# Patient Record
Sex: FEMALE | Race: BLACK OR AFRICAN AMERICAN
[De-identification: names, ages, dates, MRNs, and addresses within clinical notes are randomized per-mention and may not be internally consistent; named-entity substitution may affect disease eponyms.]

---

## 2017-03-02 PROBLEM — Z00.00 ENCOUNTER FOR PREVENTIVE HEALTH EXAMINATION: Status: ACTIVE | Noted: 2017-03-02

## 2017-03-17 ENCOUNTER — APPOINTMENT (OUTPATIENT)
Dept: HEMATOLOGY ONCOLOGY | Facility: CLINIC | Age: 57
End: 2017-03-17

## 2017-03-17 ENCOUNTER — RESULT REVIEW (OUTPATIENT)
Age: 57
End: 2017-03-17

## 2017-03-17 VITALS
RESPIRATION RATE: 17 BRPM | TEMPERATURE: 97 F | WEIGHT: 293 LBS | HEIGHT: 65 IN | BODY MASS INDEX: 48.82 KG/M2 | SYSTOLIC BLOOD PRESSURE: 134 MMHG | HEART RATE: 86 BPM | DIASTOLIC BLOOD PRESSURE: 75 MMHG

## 2017-03-17 DIAGNOSIS — G47.33 OBSTRUCTIVE SLEEP APNEA (ADULT) (PEDIATRIC): ICD-10-CM

## 2017-03-17 DIAGNOSIS — Z82.49 FAMILY HISTORY OF ISCHEMIC HEART DISEASE AND OTHER DISEASES OF THE CIRCULATORY SYSTEM: ICD-10-CM

## 2017-03-17 LAB
BASOPHILS # BLD: 0.01 TH/MM3
BASOPHILS NFR BLD: 0.2 %
EOSINOPHIL # BLD: 0.22 TH/MM3
EOSINOPHIL NFR BLD: 4.1 %
ERYTHROCYTE [DISTWIDTH] IN BLOOD BY AUTOMATED COUNT: 14.6 %
GRANULOCYTES # BLD: 2.93 TH/MM3
GRANULOCYTES NFR BLD: 54.1 %
HCT VFR BLD AUTO: 37.6 %
HGB BLD-MCNC: 12.2 G/DL
IMM GRANULOCYTES # BLD: 0.01 TH/MM3
IMM GRANULOCYTES NFR BLD: 0.2 %
LYMPHOCYTES # BLD: 1.85 TH/MM3
LYMPHOCYTES NFR BLD: 34.2 %
MCH RBC QN AUTO: 27.7 PG
MCHC RBC AUTO-ENTMCNC: 32.4 G/DL
MCV RBC AUTO: 85.3 FL
MONOCYTES # BLD: 0.39 TH/MM3
MONOCYTES NFR BLD: 7.2 %
PLATELET # BLD: 229 TH/MM3
PMV BLD AUTO: 9.1 FL
RBC # BLD AUTO: 4.41 MIL/MM3
WBC # BLD: 5.41 TH/MM3

## 2017-03-20 LAB
ALBUMIN SERPL-MCNC: 3.7 G/DL
ALBUMIN/GLOB SERPL: 1.16
ALP SERPL-CCNC: 53 IU/L
ALT SERPL-CCNC: 14 IU/L
ANA PAT FLD IF-IMP: ABNORMAL
ANA TITR SER: ABNORMAL
ANION GAP SERPL CALC-SCNC: 17 MEQ/L
APTT PPP: 34.3 SEC
AST SERPL-CCNC: 31 IU/L
AT III AG PPP IA-MCNC: 105 %
BILIRUB SERPL-MCNC: 0.6 MG/DL
BUN SERPL-MCNC: 7 MG/DL
BUN/CREAT SERPL: 7.1 %
CALCIUM SERPL-MCNC: 9.3 MG/DL
CHLORIDE SERPL-SCNC: 99 MEQ/L
CO2 SERPL-SCNC: 26 MEQ/L
CREAT SERPL-MCNC: 0.99 MG/DL
GFR SERPL CREATININE-BSD FRML MDRD: 70
GLUCOSE SERPL-MCNC: 34 MG/DL
INR PPP: 2.2
LACTATE DEHYDROGENASE (NORTH): 191 IU/L
POTASSIUM SERPL-SCNC: 4.2 MMOL/L
PROT SERPL-MCNC: 6.9 G/DL
PROTEIN S ANTIGENIC (NORTH): 39 %
PROTHROMBIN TIME: 24.7 SEC
SODIUM SERPL-SCNC: 142 MEQ/L

## 2017-03-21 ENCOUNTER — RECORD ABSTRACTING (OUTPATIENT)
Age: 57
End: 2017-03-21

## 2017-03-21 DIAGNOSIS — N95.0 POSTMENOPAUSAL BLEEDING: ICD-10-CM

## 2017-03-21 DIAGNOSIS — E78.00 PURE HYPERCHOLESTEROLEMIA, UNSPECIFIED: ICD-10-CM

## 2017-03-21 DIAGNOSIS — I82.409 ACUTE EMBOLISM AND THROMBOSIS OF UNSPECIFIED DEEP VEINS OF UNSPECIFIED LOWER EXTREMITY: ICD-10-CM

## 2017-03-21 DIAGNOSIS — Z82.5 FAMILY HISTORY OF ASTHMA AND OTHER CHRONIC LOWER RESPIRATORY DISEASES: ICD-10-CM

## 2017-03-21 DIAGNOSIS — Z83.2 FAMILY HISTORY OF DISEASES OF THE BLOOD AND BLOOD-FORMING ORGANS AND CERTAIN DISORDERS INVOLVING THE IMMUNE MECHANISM: ICD-10-CM

## 2017-03-21 DIAGNOSIS — Z12.4 ENCOUNTER FOR SCREENING FOR MALIGNANT NEOPLASM OF CERVIX: ICD-10-CM

## 2017-03-21 DIAGNOSIS — G56.00 CARPAL TUNNEL SYNDROME, UNSPECIFIED UPPER LIMB: ICD-10-CM

## 2017-03-21 LAB — PROT C ACT/NOR PPP: 68 %

## 2017-03-21 RX ORDER — ALBUTEROL 90 MCG
AEROSOL (GRAM) INHALATION
Refills: 0 | Status: ACTIVE | COMMUNITY

## 2017-03-23 LAB
HCYS SERPL-MCNC: 12.3 UMOL/L
JAK2 GENE MUT ANL BLD/T: NORMAL
Lab: 52 % NORMAL
PROT S ACT/NOR PPP: 36 %
PROTEIN C ANTIGENIC (NORTH): 77 %

## 2017-03-30 LAB
B2 GLYCOPROT1 IGA SER-ACNC: < 9 SAU
B2 GLYCOPROT1 IGG SER-ACNC: < 9 SGU
B2 GLYCOPROT1 IGM SER-ACNC: < 9 SMU
CARDIOLIPIN IGG SER IA-ACNC: < 14 GPL
CARDIOLIPIN IGM SER IA-ACNC: < 12 MPL
INTERP & REPORT- F5LEI (NORTH): NORMAL

## 2017-04-05 LAB
INTERPRETATION AND REPORT- PF2 (NORTH): NORMAL
PS IGG SER-ACNC: <10 U/ML
PS IGM SER-ACNC: <25 U/ML

## 2017-05-12 ENCOUNTER — APPOINTMENT (OUTPATIENT)
Dept: HEMATOLOGY ONCOLOGY | Facility: CLINIC | Age: 57
End: 2017-05-12

## 2017-05-12 ENCOUNTER — OUTPATIENT (OUTPATIENT)
Dept: OUTPATIENT SERVICES | Facility: HOSPITAL | Age: 57
LOS: 1 days | Discharge: HOME | End: 2017-05-12

## 2017-05-12 VITALS
OXYGEN SATURATION: 100 % | TEMPERATURE: 95.9 F | DIASTOLIC BLOOD PRESSURE: 78 MMHG | RESPIRATION RATE: 12 BRPM | BODY MASS INDEX: 48.82 KG/M2 | SYSTOLIC BLOOD PRESSURE: 124 MMHG | HEART RATE: 82 BPM | WEIGHT: 293 LBS | HEIGHT: 65 IN

## 2017-06-02 ENCOUNTER — OUTPATIENT (OUTPATIENT)
Dept: OUTPATIENT SERVICES | Facility: HOSPITAL | Age: 57
LOS: 1 days | Discharge: HOME | End: 2017-06-02

## 2017-06-02 DIAGNOSIS — I50.9 HEART FAILURE, UNSPECIFIED: ICD-10-CM

## 2017-06-02 DIAGNOSIS — M79.604 PAIN IN RIGHT LEG: ICD-10-CM

## 2017-06-28 DIAGNOSIS — Z79.01 LONG TERM (CURRENT) USE OF ANTICOAGULANTS: ICD-10-CM

## 2017-06-28 DIAGNOSIS — I27.82 CHRONIC PULMONARY EMBOLISM: ICD-10-CM

## 2017-07-07 ENCOUNTER — OUTPATIENT (OUTPATIENT)
Dept: OUTPATIENT SERVICES | Facility: HOSPITAL | Age: 57
LOS: 1 days | Discharge: HOME | End: 2017-07-07

## 2017-07-07 DIAGNOSIS — E66.01 MORBID (SEVERE) OBESITY DUE TO EXCESS CALORIES: ICD-10-CM

## 2017-07-07 DIAGNOSIS — I26.99 OTHER PULMONARY EMBOLISM WITHOUT ACUTE COR PULMONALE: ICD-10-CM

## 2017-07-07 DIAGNOSIS — M79.604 PAIN IN RIGHT LEG: ICD-10-CM

## 2017-07-07 DIAGNOSIS — I82.432 ACUTE EMBOLISM AND THROMBOSIS OF LEFT POPLITEAL VEIN: ICD-10-CM

## 2017-07-07 DIAGNOSIS — I50.9 HEART FAILURE, UNSPECIFIED: ICD-10-CM

## 2017-07-07 DIAGNOSIS — I27.82 CHRONIC PULMONARY EMBOLISM: ICD-10-CM

## 2017-07-07 DIAGNOSIS — E11.42 TYPE 2 DIABETES MELLITUS WITH DIABETIC POLYNEUROPATHY: ICD-10-CM

## 2017-07-07 DIAGNOSIS — Z79.01 LONG TERM (CURRENT) USE OF ANTICOAGULANTS: ICD-10-CM

## 2017-09-01 ENCOUNTER — OUTPATIENT (OUTPATIENT)
Dept: OUTPATIENT SERVICES | Facility: HOSPITAL | Age: 57
LOS: 1 days | Discharge: HOME | End: 2017-09-01

## 2017-09-01 DIAGNOSIS — I27.82 CHRONIC PULMONARY EMBOLISM: ICD-10-CM

## 2017-09-01 DIAGNOSIS — Z79.01 LONG TERM (CURRENT) USE OF ANTICOAGULANTS: ICD-10-CM

## 2017-09-01 DIAGNOSIS — M79.604 PAIN IN RIGHT LEG: ICD-10-CM

## 2017-09-01 DIAGNOSIS — I50.9 HEART FAILURE, UNSPECIFIED: ICD-10-CM

## 2017-10-04 ENCOUNTER — OUTPATIENT (OUTPATIENT)
Dept: OUTPATIENT SERVICES | Facility: HOSPITAL | Age: 57
LOS: 1 days | Discharge: HOME | End: 2017-10-04

## 2017-10-04 DIAGNOSIS — I50.9 HEART FAILURE, UNSPECIFIED: ICD-10-CM

## 2017-10-04 DIAGNOSIS — I27.82 CHRONIC PULMONARY EMBOLISM: ICD-10-CM

## 2017-10-04 DIAGNOSIS — M79.604 PAIN IN RIGHT LEG: ICD-10-CM

## 2017-10-04 DIAGNOSIS — Z79.01 LONG TERM (CURRENT) USE OF ANTICOAGULANTS: ICD-10-CM

## 2017-10-06 ENCOUNTER — INPATIENT (INPATIENT)
Facility: HOSPITAL | Age: 57
LOS: 3 days | Discharge: HOME | End: 2017-10-10
Attending: INTERNAL MEDICINE

## 2017-10-06 DIAGNOSIS — I50.9 HEART FAILURE, UNSPECIFIED: ICD-10-CM

## 2017-10-06 DIAGNOSIS — M79.604 PAIN IN RIGHT LEG: ICD-10-CM

## 2017-10-13 DIAGNOSIS — E66.01 MORBID (SEVERE) OBESITY DUE TO EXCESS CALORIES: ICD-10-CM

## 2017-10-13 DIAGNOSIS — R06.00 DYSPNEA, UNSPECIFIED: ICD-10-CM

## 2017-10-13 DIAGNOSIS — K21.9 GASTRO-ESOPHAGEAL REFLUX DISEASE WITHOUT ESOPHAGITIS: ICD-10-CM

## 2017-10-13 DIAGNOSIS — I50.31 ACUTE DIASTOLIC (CONGESTIVE) HEART FAILURE: ICD-10-CM

## 2017-10-13 DIAGNOSIS — Z87.891 PERSONAL HISTORY OF NICOTINE DEPENDENCE: ICD-10-CM

## 2017-10-13 DIAGNOSIS — I50.9 HEART FAILURE, UNSPECIFIED: ICD-10-CM

## 2017-10-13 DIAGNOSIS — Z79.01 LONG TERM (CURRENT) USE OF ANTICOAGULANTS: ICD-10-CM

## 2017-10-13 DIAGNOSIS — J45.909 UNSPECIFIED ASTHMA, UNCOMPLICATED: ICD-10-CM

## 2017-10-13 DIAGNOSIS — Z96.652 PRESENCE OF LEFT ARTIFICIAL KNEE JOINT: ICD-10-CM

## 2017-10-13 DIAGNOSIS — M19.90 UNSPECIFIED OSTEOARTHRITIS, UNSPECIFIED SITE: ICD-10-CM

## 2017-10-13 DIAGNOSIS — Z86.718 PERSONAL HISTORY OF OTHER VENOUS THROMBOSIS AND EMBOLISM: ICD-10-CM

## 2017-10-13 DIAGNOSIS — I11.0 HYPERTENSIVE HEART DISEASE WITH HEART FAILURE: ICD-10-CM

## 2017-10-19 ENCOUNTER — OUTPATIENT (OUTPATIENT)
Dept: OUTPATIENT SERVICES | Facility: HOSPITAL | Age: 57
LOS: 1 days | Discharge: HOME | End: 2017-10-19

## 2017-10-19 DIAGNOSIS — Z79.01 LONG TERM (CURRENT) USE OF ANTICOAGULANTS: ICD-10-CM

## 2017-10-19 DIAGNOSIS — I50.9 HEART FAILURE, UNSPECIFIED: ICD-10-CM

## 2017-10-19 DIAGNOSIS — I27.82 CHRONIC PULMONARY EMBOLISM: ICD-10-CM

## 2017-10-19 DIAGNOSIS — M79.604 PAIN IN RIGHT LEG: ICD-10-CM

## 2017-11-16 ENCOUNTER — OUTPATIENT (OUTPATIENT)
Dept: OUTPATIENT SERVICES | Facility: HOSPITAL | Age: 57
LOS: 1 days | Discharge: HOME | End: 2017-11-16

## 2017-11-16 DIAGNOSIS — M79.604 PAIN IN RIGHT LEG: ICD-10-CM

## 2017-11-16 DIAGNOSIS — Z79.01 LONG TERM (CURRENT) USE OF ANTICOAGULANTS: ICD-10-CM

## 2017-11-16 DIAGNOSIS — I27.82 CHRONIC PULMONARY EMBOLISM: ICD-10-CM

## 2017-11-16 DIAGNOSIS — I50.9 HEART FAILURE, UNSPECIFIED: ICD-10-CM

## 2017-12-15 ENCOUNTER — OUTPATIENT (OUTPATIENT)
Dept: OUTPATIENT SERVICES | Facility: HOSPITAL | Age: 57
LOS: 1 days | Discharge: HOME | End: 2017-12-15

## 2017-12-15 DIAGNOSIS — I50.9 HEART FAILURE, UNSPECIFIED: ICD-10-CM

## 2017-12-15 DIAGNOSIS — I27.82 CHRONIC PULMONARY EMBOLISM: ICD-10-CM

## 2017-12-15 DIAGNOSIS — Z79.01 LONG TERM (CURRENT) USE OF ANTICOAGULANTS: ICD-10-CM

## 2017-12-15 DIAGNOSIS — M79.604 PAIN IN RIGHT LEG: ICD-10-CM

## 2017-12-29 ENCOUNTER — OUTPATIENT (OUTPATIENT)
Dept: OUTPATIENT SERVICES | Facility: HOSPITAL | Age: 57
LOS: 1 days | Discharge: HOME | End: 2017-12-29

## 2017-12-29 DIAGNOSIS — M79.604 PAIN IN RIGHT LEG: ICD-10-CM

## 2017-12-29 DIAGNOSIS — Z79.01 LONG TERM (CURRENT) USE OF ANTICOAGULANTS: ICD-10-CM

## 2017-12-29 DIAGNOSIS — I50.9 HEART FAILURE, UNSPECIFIED: ICD-10-CM

## 2017-12-29 DIAGNOSIS — I27.82 CHRONIC PULMONARY EMBOLISM: ICD-10-CM

## 2018-01-25 ENCOUNTER — OUTPATIENT (OUTPATIENT)
Dept: OUTPATIENT SERVICES | Facility: HOSPITAL | Age: 58
LOS: 1 days | Discharge: HOME | End: 2018-01-25

## 2018-01-25 DIAGNOSIS — I27.82 CHRONIC PULMONARY EMBOLISM: ICD-10-CM

## 2018-01-25 DIAGNOSIS — Z79.01 LONG TERM (CURRENT) USE OF ANTICOAGULANTS: ICD-10-CM

## 2018-01-31 ENCOUNTER — INPATIENT (INPATIENT)
Facility: HOSPITAL | Age: 58
LOS: 1 days | Discharge: HOME | End: 2018-02-02
Attending: INTERNAL MEDICINE

## 2018-02-04 DIAGNOSIS — K21.9 GASTRO-ESOPHAGEAL REFLUX DISEASE WITHOUT ESOPHAGITIS: ICD-10-CM

## 2018-02-04 DIAGNOSIS — R07.9 CHEST PAIN, UNSPECIFIED: ICD-10-CM

## 2018-02-04 DIAGNOSIS — I50.9 HEART FAILURE, UNSPECIFIED: ICD-10-CM

## 2018-02-04 DIAGNOSIS — M79.604 PAIN IN RIGHT LEG: ICD-10-CM

## 2018-02-06 DIAGNOSIS — I50.32 CHRONIC DIASTOLIC (CONGESTIVE) HEART FAILURE: ICD-10-CM

## 2018-02-06 DIAGNOSIS — Z79.01 LONG TERM (CURRENT) USE OF ANTICOAGULANTS: ICD-10-CM

## 2018-02-06 DIAGNOSIS — I25.9 CHRONIC ISCHEMIC HEART DISEASE, UNSPECIFIED: ICD-10-CM

## 2018-02-06 DIAGNOSIS — M79.602 PAIN IN LEFT ARM: ICD-10-CM

## 2018-02-06 DIAGNOSIS — Z96.652 PRESENCE OF LEFT ARTIFICIAL KNEE JOINT: ICD-10-CM

## 2018-02-06 DIAGNOSIS — I07.1 RHEUMATIC TRICUSPID INSUFFICIENCY: ICD-10-CM

## 2018-02-06 DIAGNOSIS — Z87.891 PERSONAL HISTORY OF NICOTINE DEPENDENCE: ICD-10-CM

## 2018-02-06 DIAGNOSIS — M25.561 PAIN IN RIGHT KNEE: ICD-10-CM

## 2018-02-06 DIAGNOSIS — R94.39 ABNORMAL RESULT OF OTHER CARDIOVASCULAR FUNCTION STUDY: ICD-10-CM

## 2018-02-06 DIAGNOSIS — K21.9 GASTRO-ESOPHAGEAL REFLUX DISEASE WITHOUT ESOPHAGITIS: ICD-10-CM

## 2018-02-06 DIAGNOSIS — J45.909 UNSPECIFIED ASTHMA, UNCOMPLICATED: ICD-10-CM

## 2018-02-06 DIAGNOSIS — Z88.0 ALLERGY STATUS TO PENICILLIN: ICD-10-CM

## 2018-02-06 DIAGNOSIS — E66.8 OTHER OBESITY: ICD-10-CM

## 2018-02-06 DIAGNOSIS — Z86.711 PERSONAL HISTORY OF PULMONARY EMBOLISM: ICD-10-CM

## 2018-02-06 DIAGNOSIS — R07.9 CHEST PAIN, UNSPECIFIED: ICD-10-CM

## 2018-02-06 DIAGNOSIS — Z86.718 PERSONAL HISTORY OF OTHER VENOUS THROMBOSIS AND EMBOLISM: ICD-10-CM

## 2018-02-06 DIAGNOSIS — R07.89 OTHER CHEST PAIN: ICD-10-CM

## 2018-02-06 DIAGNOSIS — I10 ESSENTIAL (PRIMARY) HYPERTENSION: ICD-10-CM

## 2018-02-12 ENCOUNTER — OUTPATIENT (OUTPATIENT)
Dept: OUTPATIENT SERVICES | Facility: HOSPITAL | Age: 58
LOS: 1 days | Discharge: HOME | End: 2018-02-12

## 2018-02-12 DIAGNOSIS — I27.82 CHRONIC PULMONARY EMBOLISM: ICD-10-CM

## 2018-02-12 DIAGNOSIS — Z79.01 LONG TERM (CURRENT) USE OF ANTICOAGULANTS: ICD-10-CM

## 2018-03-16 ENCOUNTER — OUTPATIENT (OUTPATIENT)
Dept: OUTPATIENT SERVICES | Facility: HOSPITAL | Age: 58
LOS: 1 days | Discharge: HOME | End: 2018-03-16

## 2018-03-16 DIAGNOSIS — I27.82 CHRONIC PULMONARY EMBOLISM: ICD-10-CM

## 2018-03-16 DIAGNOSIS — Z79.01 LONG TERM (CURRENT) USE OF ANTICOAGULANTS: ICD-10-CM

## 2018-03-16 DIAGNOSIS — G47.33 OBSTRUCTIVE SLEEP APNEA (ADULT) (PEDIATRIC): ICD-10-CM

## 2018-03-16 DIAGNOSIS — J45.40 MODERATE PERSISTENT ASTHMA, UNCOMPLICATED: ICD-10-CM

## 2018-03-16 DIAGNOSIS — E66.01 MORBID (SEVERE) OBESITY DUE TO EXCESS CALORIES: ICD-10-CM

## 2018-03-16 DIAGNOSIS — I26.99 OTHER PULMONARY EMBOLISM WITHOUT ACUTE COR PULMONALE: ICD-10-CM

## 2018-04-06 ENCOUNTER — EMERGENCY (EMERGENCY)
Facility: HOSPITAL | Age: 58
LOS: 0 days | Discharge: HOME | End: 2018-04-06
Attending: EMERGENCY MEDICINE

## 2018-04-06 VITALS
DIASTOLIC BLOOD PRESSURE: 77 MMHG | HEART RATE: 88 BPM | RESPIRATION RATE: 20 BRPM | OXYGEN SATURATION: 98 % | TEMPERATURE: 96 F | SYSTOLIC BLOOD PRESSURE: 175 MMHG

## 2018-04-06 DIAGNOSIS — Z88.0 ALLERGY STATUS TO PENICILLIN: ICD-10-CM

## 2018-04-06 DIAGNOSIS — M79.672 PAIN IN LEFT FOOT: ICD-10-CM

## 2018-04-06 DIAGNOSIS — Z79.01 LONG TERM (CURRENT) USE OF ANTICOAGULANTS: ICD-10-CM

## 2018-04-06 DIAGNOSIS — K21.9 GASTRO-ESOPHAGEAL REFLUX DISEASE WITHOUT ESOPHAGITIS: ICD-10-CM

## 2018-04-06 DIAGNOSIS — Z86.718 PERSONAL HISTORY OF OTHER VENOUS THROMBOSIS AND EMBOLISM: ICD-10-CM

## 2018-04-06 DIAGNOSIS — E11.9 TYPE 2 DIABETES MELLITUS WITHOUT COMPLICATIONS: ICD-10-CM

## 2018-04-06 LAB
ANION GAP SERPL CALC-SCNC: 12 MMOL/L — SIGNIFICANT CHANGE UP (ref 7–14)
APTT BLD: 28.9 SEC — SIGNIFICANT CHANGE UP (ref 27–39.2)
BASOPHILS # BLD AUTO: 0.03 K/UL — SIGNIFICANT CHANGE UP (ref 0–0.2)
BASOPHILS NFR BLD AUTO: 0.5 % — SIGNIFICANT CHANGE UP (ref 0–1)
BUN SERPL-MCNC: 10 MG/DL — SIGNIFICANT CHANGE UP (ref 10–20)
CALCIUM SERPL-MCNC: 9.1 MG/DL — SIGNIFICANT CHANGE UP (ref 8.5–10.1)
CHLORIDE SERPL-SCNC: 101 MMOL/L — SIGNIFICANT CHANGE UP (ref 98–110)
CK MB CFR SERPL CALC: 2.6 NG/ML — SIGNIFICANT CHANGE UP (ref 0.6–6.3)
CK SERPL-CCNC: 253 U/L — HIGH (ref 0–225)
CO2 SERPL-SCNC: 29 MMOL/L — SIGNIFICANT CHANGE UP (ref 17–32)
CREAT SERPL-MCNC: 0.8 MG/DL — SIGNIFICANT CHANGE UP (ref 0.7–1.5)
EOSINOPHIL # BLD AUTO: 0.34 K/UL — SIGNIFICANT CHANGE UP (ref 0–0.7)
EOSINOPHIL NFR BLD AUTO: 5.5 % — SIGNIFICANT CHANGE UP (ref 0–8)
GLUCOSE SERPL-MCNC: 88 MG/DL — SIGNIFICANT CHANGE UP (ref 70–99)
HCT VFR BLD CALC: 37.1 % — SIGNIFICANT CHANGE UP (ref 37–47)
HGB BLD-MCNC: 11.9 G/DL — LOW (ref 12–16)
IMM GRANULOCYTES NFR BLD AUTO: 0.2 % — SIGNIFICANT CHANGE UP (ref 0.1–0.3)
INR BLD: 3.06 RATIO — HIGH (ref 0.65–1.3)
LYMPHOCYTES # BLD AUTO: 1.89 K/UL — SIGNIFICANT CHANGE UP (ref 1.2–3.4)
LYMPHOCYTES # BLD AUTO: 30.8 % — SIGNIFICANT CHANGE UP (ref 20.5–51.1)
MCHC RBC-ENTMCNC: 27.5 PG — SIGNIFICANT CHANGE UP (ref 27–31)
MCHC RBC-ENTMCNC: 32.1 G/DL — SIGNIFICANT CHANGE UP (ref 32–37)
MCV RBC AUTO: 85.7 FL — SIGNIFICANT CHANGE UP (ref 81–99)
MONOCYTES # BLD AUTO: 0.46 K/UL — SIGNIFICANT CHANGE UP (ref 0.1–0.6)
MONOCYTES NFR BLD AUTO: 7.5 % — SIGNIFICANT CHANGE UP (ref 1.7–9.3)
NEUTROPHILS # BLD AUTO: 3.41 K/UL — SIGNIFICANT CHANGE UP (ref 1.4–6.5)
NEUTROPHILS NFR BLD AUTO: 55.5 % — SIGNIFICANT CHANGE UP (ref 42.2–75.2)
NRBC # BLD: 0 /100 WBCS — SIGNIFICANT CHANGE UP (ref 0–0)
NT-PROBNP SERPL-SCNC: 6 PG/ML — SIGNIFICANT CHANGE UP (ref 0–300)
PLATELET # BLD AUTO: 204 K/UL — SIGNIFICANT CHANGE UP (ref 130–400)
POTASSIUM SERPL-MCNC: 4.6 MMOL/L — SIGNIFICANT CHANGE UP (ref 3.5–5)
POTASSIUM SERPL-SCNC: 4.6 MMOL/L — SIGNIFICANT CHANGE UP (ref 3.5–5)
PROTHROM AB SERPL-ACNC: 33.8 SEC — HIGH (ref 9.95–12.87)
RBC # BLD: 4.33 M/UL — SIGNIFICANT CHANGE UP (ref 4.2–5.4)
RBC # FLD: 14.5 % — SIGNIFICANT CHANGE UP (ref 11.5–14.5)
SODIUM SERPL-SCNC: 142 MMOL/L — SIGNIFICANT CHANGE UP (ref 135–146)
TROPONIN T SERPL-MCNC: <0.01 NG/ML — SIGNIFICANT CHANGE UP
WBC # BLD: 6.14 K/UL — SIGNIFICANT CHANGE UP (ref 4.8–10.8)
WBC # FLD AUTO: 6.14 K/UL — SIGNIFICANT CHANGE UP (ref 4.8–10.8)

## 2018-04-06 NOTE — ED PROVIDER NOTE - OBJECTIVE STATEMENT
Pertinent PMH/PSH/FHx/SHx and Review of Systems contained within:  Patient presents to the ED for   59y/o F Pertinent PMH/PSH/FHx/SHx and Review of Systems contained within:  Patient presents to the ED for   57y/o F w/ hx of DVT on coumadin (a month ago= inr 2.3), type 2 diabetes sleep apnea, gerd presents for swelling and pain to L foot mainly to dorsum of foot and medial malleus started 2 days ago. Pt also has some "chest discomfort". no cough, congestion,  runny nose fevers.   In January pt had  CTA which was normal.

## 2018-04-06 NOTE — ED ADULT NURSE NOTE - OBJECTIVE STATEMENT
Patient complains of right leg and ankle pain becoming progressively worse over 3 days. Patient has hx of DVT/

## 2018-04-06 NOTE — ED PROVIDER NOTE - PROGRESS NOTE DETAILS
3/16/18 inr 2.2 ranging 2.2-2.4  today inr 3.06   patient advised to follow up [facility] PCP for INR levels.

## 2018-04-06 NOTE — ED PROVIDER NOTE - NS ED ROS FT
ROS: No fever/chills, No headache/photophobia/eye pain/changes in vision, No ear pain/sore throat/dysphagia, No chest pain/palpitations, no SOB/cough/wheeze/stridor, No abdominal pain, No N/V/D/melena, no dysuria/frequency/discharge, No neck/back pain, no rash, no changes in neurological status/function.    +ChestPain/discomfort

## 2018-04-06 NOTE — ED PROVIDER NOTE - PHYSICAL EXAMINATION
VITAL SIGNS: I have reviewed nursing notes and confirm.  CONSTITUTIONAL: Well-developed; well-nourished; in no acute distress. pt comfortable.  SKIN: skin exam is warm and dry, no acute rash.   HEAD: Normocephalic; atraumatic.  EYES:  EOM intact; conjunctiva and sclera clear.  ENT: No nasal discharge; airway clear. moist oral mucosa; uvula at midline. no pharyngeal erythema, edema exudate or vesicles.  TMs with good light reflex b/l.    NECK: Supple; non tender.  CARD: S1, S2 normal; no murmurs, gallops, or rubs. Regular rate and rhythm. posterior tibial and radial pulses 2+  RESP: No wheezes, rales or rhonchi. cta b/l. no use of accessory muscles. no retractions  ABD: Normal bowel sounds; soft; non-distended; non-tender; no rebound.   EXT: Normal ROM to ankles, toes, knees.  mild swelling to dorsum of L. foot and medial ankle. patient able to range.  posterior tibial and dorsalis pedis pulses 2+  LYMPH: No acute cervical adenopathy.  NEURO: Alert, oriented, grossly unremarkable.    PSYCH: Cooperative, appropriate.

## 2018-04-06 NOTE — ED PROVIDER NOTE - PLAN OF CARE
foot aced wrapped foot aced wrapped. pt to follow up with coumadin clinic. all lab work and results given to pt.  Pt will return for any symptoms as discussed.  Will f/u with PCP

## 2018-04-06 NOTE — ED PROVIDER NOTE - ATTENDING CONTRIBUTION TO CARE
57 y/o female with hx of DVT on Coumadin, last INR = 2.3 approximately 1 month ago presents to ED with complaint of atraumatic left foot pain and swelling x 3 days.  No CP/SOB.  No fever or chills.  PE:  left foot mild swelling, no redness or warmth.  Distal pulses equal bilaterally.  A/P:  Foot Pain NOS, check XRay and duplex given hx of DVT.

## 2018-04-06 NOTE — ED PROVIDER NOTE - CARE PLAN
Principal Discharge DX:	Foot pain, left  Assessment and plan of treatment:	foot aced wrapped Principal Discharge DX:	Foot pain, left  Assessment and plan of treatment:	foot aced wrapped. pt to follow up with coumadin clinic. all lab work and results given to pt.  Pt will return for any symptoms as discussed.  Will f/u with PCP

## 2018-04-20 ENCOUNTER — OUTPATIENT (OUTPATIENT)
Dept: OUTPATIENT SERVICES | Facility: HOSPITAL | Age: 58
LOS: 1 days | Discharge: HOME | End: 2018-04-20

## 2018-04-20 DIAGNOSIS — I27.82 CHRONIC PULMONARY EMBOLISM: ICD-10-CM

## 2018-04-20 DIAGNOSIS — Z79.01 LONG TERM (CURRENT) USE OF ANTICOAGULANTS: ICD-10-CM

## 2018-05-05 ENCOUNTER — INPATIENT (INPATIENT)
Facility: HOSPITAL | Age: 58
LOS: 5 days | Discharge: HOME | End: 2018-05-11
Attending: INTERNAL MEDICINE | Admitting: INTERNAL MEDICINE
Payer: MEDICARE

## 2018-05-05 VITALS
TEMPERATURE: 98 F | RESPIRATION RATE: 18 BRPM | SYSTOLIC BLOOD PRESSURE: 146 MMHG | OXYGEN SATURATION: 97 % | DIASTOLIC BLOOD PRESSURE: 84 MMHG | HEART RATE: 92 BPM

## 2018-05-05 LAB
ANION GAP SERPL CALC-SCNC: 15 MMOL/L — HIGH (ref 7–14)
APPEARANCE UR: CLEAR — SIGNIFICANT CHANGE UP
APTT BLD: 32.7 SEC — SIGNIFICANT CHANGE UP (ref 27–39.2)
BACTERIA # UR AUTO: (no result) /HPF
BILIRUB UR-MCNC: NEGATIVE — SIGNIFICANT CHANGE UP
BUN SERPL-MCNC: 10 MG/DL — SIGNIFICANT CHANGE UP (ref 10–20)
CALCIUM SERPL-MCNC: 8.9 MG/DL — SIGNIFICANT CHANGE UP (ref 8.5–10.1)
CHLORIDE SERPL-SCNC: 102 MMOL/L — SIGNIFICANT CHANGE UP (ref 98–110)
CO2 SERPL-SCNC: 24 MMOL/L — SIGNIFICANT CHANGE UP (ref 17–32)
COLOR SPEC: YELLOW — SIGNIFICANT CHANGE UP
CREAT SERPL-MCNC: 0.9 MG/DL — SIGNIFICANT CHANGE UP (ref 0.7–1.5)
DIFF PNL FLD: (no result)
EPI CELLS # UR: (no result) /HPF
GLUCOSE SERPL-MCNC: 99 MG/DL — SIGNIFICANT CHANGE UP (ref 70–99)
GLUCOSE UR QL: NEGATIVE MG/DL — SIGNIFICANT CHANGE UP
HCT VFR BLD CALC: 36.9 % — LOW (ref 37–47)
HGB BLD-MCNC: 11.8 G/DL — LOW (ref 12–16)
INR BLD: 3.48 RATIO — HIGH (ref 0.65–1.3)
KETONES UR-MCNC: NEGATIVE — SIGNIFICANT CHANGE UP
LEUKOCYTE ESTERASE UR-ACNC: NEGATIVE — SIGNIFICANT CHANGE UP
MCHC RBC-ENTMCNC: 26.8 PG — LOW (ref 27–31)
MCHC RBC-ENTMCNC: 32 G/DL — SIGNIFICANT CHANGE UP (ref 32–37)
MCV RBC AUTO: 83.7 FL — SIGNIFICANT CHANGE UP (ref 81–99)
NITRITE UR-MCNC: NEGATIVE — SIGNIFICANT CHANGE UP
NRBC # BLD: 0 /100 WBCS — SIGNIFICANT CHANGE UP (ref 0–0)
PH UR: 7 — SIGNIFICANT CHANGE UP (ref 5–8)
PLATELET # BLD AUTO: 204 K/UL — SIGNIFICANT CHANGE UP (ref 130–400)
POTASSIUM SERPL-MCNC: 4.1 MMOL/L — SIGNIFICANT CHANGE UP (ref 3.5–5)
POTASSIUM SERPL-SCNC: 4.1 MMOL/L — SIGNIFICANT CHANGE UP (ref 3.5–5)
PROT UR-MCNC: NEGATIVE MG/DL — SIGNIFICANT CHANGE UP
PROTHROM AB SERPL-ACNC: 38.5 SEC — HIGH (ref 9.95–12.87)
RBC # BLD: 4.41 M/UL — SIGNIFICANT CHANGE UP (ref 4.2–5.4)
RBC # FLD: 14.6 % — HIGH (ref 11.5–14.5)
RBC CASTS # UR COMP ASSIST: (no result) /HPF
SODIUM SERPL-SCNC: 141 MMOL/L — SIGNIFICANT CHANGE UP (ref 135–146)
SP GR SPEC: 1.01 — SIGNIFICANT CHANGE UP (ref 1.01–1.03)
UROBILINOGEN FLD QL: 0.2 MG/DL — SIGNIFICANT CHANGE UP (ref 0.2–0.2)
WBC # BLD: 7.88 K/UL — SIGNIFICANT CHANGE UP (ref 4.8–10.8)
WBC # FLD AUTO: 7.88 K/UL — SIGNIFICANT CHANGE UP (ref 4.8–10.8)

## 2018-05-05 RX ORDER — FUROSEMIDE 40 MG
20 TABLET ORAL DAILY
Qty: 0 | Refills: 0 | Status: DISCONTINUED | OUTPATIENT
Start: 2018-05-05 | End: 2018-05-11

## 2018-05-05 RX ORDER — CEFTRIAXONE 500 MG/1
2 INJECTION, POWDER, FOR SOLUTION INTRAMUSCULAR; INTRAVENOUS EVERY 24 HOURS
Qty: 0 | Refills: 0 | Status: DISCONTINUED | OUTPATIENT
Start: 2018-05-06 | End: 2018-05-07

## 2018-05-05 RX ORDER — LIDOCAINE 4 G/100G
1 CREAM TOPICAL ONCE
Qty: 0 | Refills: 0 | Status: COMPLETED | OUTPATIENT
Start: 2018-05-05 | End: 2018-05-05

## 2018-05-05 RX ORDER — PHENAZOPYRIDINE HCL 100 MG
100 TABLET ORAL THREE TIMES A DAY
Qty: 0 | Refills: 0 | Status: DISCONTINUED | OUTPATIENT
Start: 2018-05-05 | End: 2018-05-11

## 2018-05-05 RX ORDER — SODIUM CHLORIDE 9 MG/ML
3 INJECTION INTRAMUSCULAR; INTRAVENOUS; SUBCUTANEOUS EVERY 8 HOURS
Qty: 0 | Refills: 0 | Status: DISCONTINUED | OUTPATIENT
Start: 2018-05-05 | End: 2018-05-11

## 2018-05-05 RX ORDER — LISINOPRIL 2.5 MG/1
10 TABLET ORAL DAILY
Qty: 0 | Refills: 0 | Status: DISCONTINUED | OUTPATIENT
Start: 2018-05-05 | End: 2018-05-11

## 2018-05-05 RX ORDER — CEFTRIAXONE 500 MG/1
INJECTION, POWDER, FOR SOLUTION INTRAMUSCULAR; INTRAVENOUS
Qty: 0 | Refills: 0 | Status: DISCONTINUED | OUTPATIENT
Start: 2018-05-05 | End: 2018-05-07

## 2018-05-05 RX ORDER — AMLODIPINE BESYLATE 2.5 MG/1
10 TABLET ORAL DAILY
Qty: 0 | Refills: 0 | Status: DISCONTINUED | OUTPATIENT
Start: 2018-05-05 | End: 2018-05-11

## 2018-05-05 RX ORDER — MORPHINE SULFATE 50 MG/1
2 CAPSULE, EXTENDED RELEASE ORAL EVERY 4 HOURS
Qty: 0 | Refills: 0 | Status: DISCONTINUED | OUTPATIENT
Start: 2018-05-05 | End: 2018-05-06

## 2018-05-05 RX ORDER — METHOCARBAMOL 500 MG/1
1000 TABLET, FILM COATED ORAL ONCE
Qty: 0 | Refills: 0 | Status: COMPLETED | OUTPATIENT
Start: 2018-05-05 | End: 2018-05-05

## 2018-05-05 RX ORDER — FLUTICASONE PROPIONATE 50 MCG
0 SPRAY, SUSPENSION NASAL
Qty: 16 | Refills: 0 | COMMUNITY

## 2018-05-05 RX ORDER — ACETAMINOPHEN 500 MG
975 TABLET ORAL ONCE
Qty: 0 | Refills: 0 | Status: COMPLETED | OUTPATIENT
Start: 2018-05-05 | End: 2018-05-05

## 2018-05-05 RX ORDER — FUROSEMIDE 40 MG
0 TABLET ORAL
Qty: 30 | Refills: 0 | COMMUNITY

## 2018-05-05 RX ORDER — ALBUTEROL 90 UG/1
0 AEROSOL, METERED ORAL
Qty: 18 | Refills: 0 | COMMUNITY

## 2018-05-05 RX ORDER — PANTOPRAZOLE SODIUM 20 MG/1
0 TABLET, DELAYED RELEASE ORAL
Qty: 30 | Refills: 0 | COMMUNITY

## 2018-05-05 RX ORDER — MONTELUKAST 4 MG/1
10 TABLET, CHEWABLE ORAL DAILY
Qty: 0 | Refills: 0 | Status: DISCONTINUED | OUTPATIENT
Start: 2018-05-05 | End: 2018-05-11

## 2018-05-05 RX ORDER — BUDESONIDE AND FORMOTEROL FUMARATE DIHYDRATE 160; 4.5 UG/1; UG/1
2 AEROSOL RESPIRATORY (INHALATION)
Qty: 0 | Refills: 0 | Status: DISCONTINUED | OUTPATIENT
Start: 2018-05-05 | End: 2018-05-11

## 2018-05-05 RX ORDER — MONTELUKAST 4 MG/1
0 TABLET, CHEWABLE ORAL
Qty: 30 | Refills: 0 | COMMUNITY

## 2018-05-05 RX ORDER — PANTOPRAZOLE SODIUM 20 MG/1
40 TABLET, DELAYED RELEASE ORAL
Qty: 0 | Refills: 0 | Status: DISCONTINUED | OUTPATIENT
Start: 2018-05-05 | End: 2018-05-11

## 2018-05-05 RX ORDER — BUDESONIDE AND FORMOTEROL FUMARATE DIHYDRATE 160; 4.5 UG/1; UG/1
0 AEROSOL RESPIRATORY (INHALATION)
Qty: 10.2 | Refills: 0 | COMMUNITY

## 2018-05-05 RX ORDER — CEFTRIAXONE 500 MG/1
2 INJECTION, POWDER, FOR SOLUTION INTRAMUSCULAR; INTRAVENOUS ONCE
Qty: 0 | Refills: 0 | Status: COMPLETED | OUTPATIENT
Start: 2018-05-05 | End: 2018-05-05

## 2018-05-05 RX ORDER — ALBUTEROL 90 UG/1
2 AEROSOL, METERED ORAL EVERY 6 HOURS
Qty: 0 | Refills: 0 | Status: DISCONTINUED | OUTPATIENT
Start: 2018-05-05 | End: 2018-05-11

## 2018-05-05 RX ORDER — DEXAMETHASONE 0.5 MG/5ML
10 ELIXIR ORAL ONCE
Qty: 0 | Refills: 0 | Status: COMPLETED | OUTPATIENT
Start: 2018-05-05 | End: 2018-05-05

## 2018-05-05 RX ORDER — MORPHINE SULFATE 50 MG/1
6 CAPSULE, EXTENDED RELEASE ORAL ONCE
Qty: 0 | Refills: 0 | Status: DISCONTINUED | OUTPATIENT
Start: 2018-05-05 | End: 2018-05-05

## 2018-05-05 RX ORDER — FLUTICASONE PROPIONATE 50 MCG
1 SPRAY, SUSPENSION NASAL
Qty: 0 | Refills: 0 | Status: DISCONTINUED | OUTPATIENT
Start: 2018-05-05 | End: 2018-05-11

## 2018-05-05 RX ADMIN — CEFTRIAXONE 100 GRAM(S): 500 INJECTION, POWDER, FOR SOLUTION INTRAMUSCULAR; INTRAVENOUS at 23:11

## 2018-05-05 RX ADMIN — METHOCARBAMOL 1000 MILLIGRAM(S): 500 TABLET, FILM COATED ORAL at 17:48

## 2018-05-05 RX ADMIN — MORPHINE SULFATE 2 MILLIGRAM(S): 50 CAPSULE, EXTENDED RELEASE ORAL at 23:09

## 2018-05-05 RX ADMIN — LIDOCAINE 1 PATCH: 4 CREAM TOPICAL at 23:33

## 2018-05-05 RX ADMIN — Medication 10 MILLIGRAM(S): at 19:48

## 2018-05-05 RX ADMIN — SODIUM CHLORIDE 3 MILLILITER(S): 9 INJECTION INTRAMUSCULAR; INTRAVENOUS; SUBCUTANEOUS at 21:14

## 2018-05-05 RX ADMIN — MORPHINE SULFATE 6 MILLIGRAM(S): 50 CAPSULE, EXTENDED RELEASE ORAL at 19:48

## 2018-05-05 RX ADMIN — Medication 975 MILLIGRAM(S): at 17:48

## 2018-05-05 NOTE — ED PROVIDER NOTE - NS ED ROS FT
Constitutional: No fever, chills.  Cardiac:  No chest pain, palpitations  Respiratory:  No cough, SOB  GI:  No nausea, vomiting, diarrhea, abdominal pain.  : See HPI. No dysuria  MS:  +back pain, leg pain  Neuro:  No headache or lightheadedness  Skin:  No skin rash  Endocrine: No history of thyroid disease or diabetes.  Except as documented in the HPI,  all other systems are negative.

## 2018-05-05 NOTE — H&P ADULT - ASSESSMENT
59 y/o female with history of     1)  Recommendation  f/u mri   UA and urine culture   panculture   neurosurgery consult -   PT/Rehab    2)DVT/PE on coumadin  3)asthma  4)dvt ppx- patient on coumadin  5)code status - 59 y/o female with history of copd , dvt/pe  on coumadin ,htn , blaine on cpap ,dm-2 Presented with c/o LBP radiating to RLE x 3 days     1)LBP radiating to RLE - physical exam significant for + SLR right  ; no fecal or urinary incontinence , no saddle anesthesia , likely etiology possible disk herniation vs MSK   Recommendation  f/u mri results  neurosurgery consult - f/u recs  PT/Rehab  pain control       2)dysuria with +ve UA and lower abdomen pain tenderness - treat for cystitis not associated with sepsis or shock   iv rocephin 2 gm iv q24  f/u panculture - urine and blood     3)DVT/PE on coumadin  inr supratherapeutic - hold coumadin tonight , will resume in am as per inr  4)asthma / copd - possible  ACOS ? - stable   c/w duonebs , symbicort     5)blaine on cpap   6)dm-2 - monitor FS and initiate insulin sliding scale if FS >140  7)dvt ppx- patient on coumadin for dvt treatment   8)full code from home

## 2018-05-05 NOTE — H&P ADULT - HISTORY OF PRESENT ILLNESS
59 y/o female with history of asthma , DVT/PE on coumadin , copd , YOLIE on cpap , presented to ER for c/o 57 y/o female with history of asthma , DVT/PE on coumadin , copd , YOLIE on cpap , presented to ER for c/o low back pain radiating to right leg x 3 days  As per patient back pain was sudden in onset , first noticed 3 days ptp , sharp shooting type, 8/10 in intensity worsened by walking and movement .No h/o trauma or fall .denies urinary or fecal incontinence , fever, chills , diarrhea , constipation .  does c/o dysuria x 3 days   denies any history of heavy weight lifting or recent strenuous activity , no h/o similar complaints in the past  at baseline patient uses a cane for ambulation 57 y/o female with history of asthma , DVT/PE on coumadin , copd, htn , YOLIE on cpap , presented to ER for c/o low back pain radiating to right leg x 3 days  As per patient back pain was sudden in onset , first noticed 3 days ptp , sharp shooting type, 8/10 in intensity worsened by walking and movement .No h/o trauma or fall .denies urinary or fecal incontinence , fever, chills , diarrhea , constipation .  does c/o dysuria x 3 days   denies any history of heavy weight lifting or recent strenuous activity , no h/o similar complaints in the past  at baseline patient uses a cane for ambulation

## 2018-05-05 NOTE — ED ADULT NURSE NOTE - PMH
Asthma, unspecified asthma severity, unspecified whether complicated, unspecified whether persistent    Atrial fibrillation, unspecified type    Chronic obstructive pulmonary disease, unspecified COPD type    Congestive heart failure, unspecified HF chronicity, unspecified heart failure type    DVT (deep venous thrombosis)    Gastroesophageal reflux disease without esophagitis    PE (pulmonary thromboembolism)    Sleep apnea, unspecified type

## 2018-05-05 NOTE — H&P ADULT - NSHPLABSRESULTS_GEN_ALL_CORE
LABS:                        11.8   7.88  )-----------( 204      ( 05 May 2018 19:25 )             36.9     05 May 2018 19:25    141    |  102    |  10     ----------------------------<  99     4.1     |  24     |  0.9      Ca    8.9        05 May 2018 19:25      PT/INR - ( 05 May 2018 19:25 )   PT: 38.50 sec;   INR: 3.48 ratio         PTT - ( 05 May 2018 19:25 )  PTT:32.7 sec

## 2018-05-05 NOTE — CONSULT NOTE ADULT - ATTENDING COMMENTS
Patient's right lower extremity pain is at least partially related to her known knee arthritis which needs replacement surgery according to the patient. She also appears to have L4 distribution pain related to the moderate L4 foraminal stenosis although the MRI suggested worse findings on the left and at L5. She also complains of right hip and groin pain.  Recommend pain management consult for possible epidural injection. Consult Orthopedics for the knee and possibly hip pain. X-ray of right hip.

## 2018-05-05 NOTE — H&P ADULT - NSHPREVIEWOFSYSTEMS_GEN_ALL_CORE
CONSTITUTIONAL: No weakness, fevers or chills  EYES/ENT: No visual changes;  No vertigo or throat pain   NECK: No pain or stiffness  RESPIRATORY: No cough, wheezing, hemoptysis; No shortness of breath  CARDIOVASCULAR: No chest pain or palpitations  GASTROINTESTINAL: No abdominal or epigastric pain. No nausea, vomiting, or hematemesis; No diarrhea or constipation. No melena or hematochezia.  GENITOURINARY: No dysuria, frequency or hematuria  NEUROLOGICAL: No numbness or weakness  SKIN: No itching, rashes CONSTITUTIONAL: No weakness, fevers or chills  EYES/ENT: No visual changes;  No vertigo or throat pain   NECK: No pain or stiffness  RESPIRATORY: No cough, wheezing, hemoptysis; No shortness of breath  CARDIOVASCULAR: No chest pain or palpitations  GASTROINTESTINAL: No abdominal or epigastric pain. No nausea, vomiting, or hematemesis; No diarrhea or constipation. No melena or hematochezia.  GENITOURINARY: dysuria +  NEUROLOGICAL: No numbness or weakness  SKIN: No itching, rashes

## 2018-05-05 NOTE — ED PROVIDER NOTE - OBJECTIVE STATEMENT
57yo F with PMHx PE/DVT on coumadin, left total knee replacement, asthma, YOLIE, p/w right lower back pain radiating down right leg since this afternoon. Assoc with paresthesias down leg. Also states that she feels like today when urinating, feels like she is only dribbling a little bit and not emptying completely. Walks with assistance of cane, unable to ambulate currently 2/2 pain. No saddle anesthesia. No trauma/fall. Denies fever, headache, lightheadedness, CP, SOB, cough, nausea, vomiting, diarrhea, abd pain, dysuria, hematuria, leg swelling.

## 2018-05-05 NOTE — ED PROVIDER NOTE - ATTENDING CONTRIBUTION TO CARE
I personally evaluated the patient. I reviewed the Resident’s or Physician Assistant’s note (as assigned above), and agree with the findings and plan except as documented in my note.  57 yo F with history of L total knee replacement, history of DVt/PE on coumadin presents to ED with R sided back pain radiating down to R knee. Patient reports she has chronic R sided knee pain. Patient reported having incomplete bladder emptying about 3 days ago. No recent falls or trauma. Patient reports some tingling around private area. Patient usually ambulates with cane, but now having more difficulty No fecal incontinence. Pain is now in R lower back radiating to anterior R thigh PE: in pain, no midline spinal tenderness, + obese, mild suprapubic tenderness, + sLR on R, mild weakness of lower extremity secondary to pain, + diminished rectal tone, no saddle anesthesia, minimal urine on bedside US (~100 cc) Plan: labs, UA, MRI, decadron, neurosurgery consult.

## 2018-05-05 NOTE — ED PROVIDER NOTE - MEDICAL DECISION MAKING DETAILS
Patient unable to ambulate secondary to back pain. Patient could have UTI with severe sciatica, however has paresthesia with diminished rectal tone, steroids given, MRI ordered

## 2018-05-05 NOTE — ED PROVIDER NOTE - PHYSICAL EXAMINATION
Vital signs reviewed  GENERAL: Patient nontoxic but appears uncomfortable  HEAD: NCAT  ENT: MMM  RESPIRATORY: Normal respiratory effort. CTA B/L. No wheezing, rales, rhonchi  CARDIOVASCULAR: Regular rate and rhythm. Normal S1/S2. No murmurs, rubs or gallops.  ABDOMEN: Soft. Obese. Mild suprapubic tenderness. No guarding or rebound. No CVA tenderness.  MUSCULOSKELETAL/EXTREMITIES: Mild tenderness along lateral right thigh and right paraspinal. Normal ROM of knees. Brisk cap refill. 2+ radial pulses. No leg edema.   RECTAL: Slightly diminished rectal tone  SKIN:  Warm and dry. No acute rash.  NEURO: AAOx3. No gross FND. Sensation intact.   PSYCHIATRIC: Cooperative. Affect appropriate.

## 2018-05-05 NOTE — H&P ADULT - NSHPPHYSICALEXAM_GEN_ALL_CORE
General: NAD, AAO x3  HEENT: No icterus,. Moist mucous membranes  Neck: No JVD noted. Supple, no meningismus  Cardio: S1, S2 noted, RRR. No murmurs, rubs or gallops  Resp: Clear to auscultation b/l. No adventitious sounds  Abdo: Soft, NT, bowel sounds present. No organomegaly  Extremities: No edema noted. Pulses present b/l  Neuro: AAO x3,     Lymphnodes: no lymphadenopathy identified.  Skin: Dry, no rashes General: NAD, AAO x3  HEENT: No icterus,. Moist mucous membranes  Neck: No JVD noted. Supple, no meningismus  Cardio: S1, S2 noted, RRR. No murmurs, rubs or gallops  Resp: Clear to auscultation b/l. No adventitious sounds  Abdo: Soft, NT, bowel sounds present. No organomegaly  Extremities: No edema noted. Pulses present b/l  Neuro: AAO x3, grossly normal motor strength , no focal deficits              spinal tenderness + L4 and + SLR on right    Lymphnodes: no lymphadenopathy identified.  Skin: Dry, no rashes General: NAD, AAO x3  HEENT: No icterus,. Moist mucous membranes  Neck: No JVD noted. Supple, no meningismus  Cardio: S1, S2 noted, RRR. No murmurs, rubs or gallops  Resp: Clear to auscultation b/l. No adventitious sounds  Abdo: Soft, tender to palpation + hypogastric quadrant, bowel sounds present. No organomegaly  Extremities: No edema noted. Pulses present b/l , SLR + right  Neuro: AAO x3, grossly normal motor strength , no focal deficits              spinal tenderness + L4 and + SLR on right    Lymphnodes: no lymphadenopathy identified.  Skin: Dry, no rashes

## 2018-05-05 NOTE — ED ADULT NURSE NOTE - OBJECTIVE STATEMENT
Pt states had the pain to the right knee for a while now however the pain got so bad today the pain radiates to her groin and her lower back, Pt able to walk but difficulty walking her usual. Pt states has numbness/throbbing pain.

## 2018-05-05 NOTE — CONSULT NOTE ADULT - SUBJECTIVE AND OBJECTIVE BOX
59 yo female c/o 3 day hx of lower back pain radiating to right lower leg, today pt states pain became so severe she is unable to ambulate. Pt c/o LBP radiating to right lower leg, causing right anterior thigh numbness and tingling and sensation of "blader numbness", pt also c/p of pain radiating to right buttock. Pt states she has to strain to void. Denies LLE weakness or saddle anesthesia or urinary incontinence Pt denies recent trauma to back or fall.    PAST MEDICAL & SURGICAL HISTORY:  Atrial fibrillation, unspecified type  Congestive heart failure, unspecified HF chronicity, unspecified heart failure type  Sleep apnea, unspecified type  Gastroesophageal reflux disease without esophagitis  Asthma, unspecified asthma severity, unspecified whether complicated, unspecified whether persistent  Chronic obstructive pulmonary disease, unspecified COPD type  PE (pulmonary thromboembolism)  DVT (deep venous thrombosis)  TKA    Medications - coumadin et al, please see MAR    Allergies    penicillin (Hives)    SHx - NC    FHx - NC    Vital Signs Last 24 Hrs  T(C): 36.7 (05 May 2018 16:50), Max: 36.7 (05 May 2018 16:50)  T(F): 98 (05 May 2018 16:50), Max: 98 (05 May 2018 16:50)  HR: 92 (05 May 2018 16:50) (92 - 92)  BP: 146/84 (05 May 2018 16:50) (146/84 - 146/84)  RR: 18 (05 May 2018 16:50) (18 - 18)  SpO2: 97% (05 May 2018 16:50) (97% - 97%    Pt seen and examined at bedside  a+ox3, nad, gcs 15, follows complex commands  NC/AT, PERRL  abd - soft, nd, nttp, no numbness +spt  B/L upper extremities - strength 5/5, sensation intact, FROM  LLE - strength 5/5, sensation intact, FROM, no foot drop noted  RLE - +anterior thigh numbness, sensation wnl below the knee good plantar/dorsiflexion, pt unable to bend knee and lift leg off table due to pain  back - no cervical or thoracic TTP, +tenderness over lumbosacral spine    DWAIN performed by  - "decreased" rectal tone                          11.8   7.88  )-----------( 204      ( 05 May 2018 19:25 )             36.9             PT/INR - ( 05 May 2018 19:25 )   PT: 38.50 sec;   INR: 3.48 ratio         PTT - ( 05 May 2018 19:25 )  PTT:32.7 sec 59 yo female c/o 3 day hx of lower back pain radiating to right lower leg, today pt states pain became so severe she is unable to ambulate. Pt c/o LBP radiating to right lower leg, causing right anterior thigh numbness and tingling and sensation of "blader numbness", pt also c/p of pain radiating to right buttock. Pt states she has to strain to void. Denies LLE weakness or saddle anesthesia or urinary incontinence Pt denies recent trauma to back or fall.    PAST MEDICAL & SURGICAL HISTORY:  Atrial fibrillation, unspecified type  Congestive heart failure, unspecified HF chronicity, unspecified heart failure type  Sleep apnea, unspecified type  Gastroesophageal reflux disease without esophagitis  Asthma, unspecified asthma severity, unspecified whether complicated, unspecified whether persistent  Chronic obstructive pulmonary disease, unspecified COPD type  PE (pulmonary thromboembolism)  DVT (deep venous thrombosis)  TKA    Medications - coumadin et al, please see MAR    Allergies    penicillin (Hives)    SHx - NC    FHx - NC    Vital Signs Last 24 Hrs  T(C): 36.7 (05 May 2018 16:50), Max: 36.7 (05 May 2018 16:50)  T(F): 98 (05 May 2018 16:50), Max: 98 (05 May 2018 16:50)  HR: 92 (05 May 2018 16:50) (92 - 92)  BP: 146/84 (05 May 2018 16:50) (146/84 - 146/84)  RR: 18 (05 May 2018 16:50) (18 - 18)  SpO2: 97% (05 May 2018 16:50) (97% - 97%    Pt seen and examined at bedside  a+ox3, nad, gcs 15, follows complex commands  NC/AT, PERRL  abd - soft, nd, nttp, no numbness +spt  B/L upper extremities - strength 5/5, sensation intact, FROM  LLE - strength 5/5, sensation intact, FROM, no foot drop noted  RLE - +anterior thigh numbness, sensation wnl below the knee, good plantar/dorsiflexion, strength 4/5  back - no cervical or thoracic TTP, +tenderness over lumbosacral spine    DWAIN performed by  - "decreased" rectal tone                          11.8   7.88  )-----------( 204      ( 05 May 2018 19:25 )             36.9             PT/INR - ( 05 May 2018 19:25 )   PT: 38.50 sec;   INR: 3.48 ratio         PTT - ( 05 May 2018 19:25 )  PTT:32.7 sec 59 yo female c/o 3 day hx of lower back pain radiating to right lower leg, today pt states pain became so severe she is unable to ambulate. Pt c/o LBP radiating to right lower leg, causing right anterior thigh numbness and tingling and sensation of "bladder numbness", pt also c/p of pain radiating to right buttock. Pt states she has to strain to void. Denies LLE weakness or saddle anesthesia or urinary incontinence Pt denies recent trauma to back or fall.    PAST MEDICAL & SURGICAL HISTORY:  Atrial fibrillation, unspecified type  Congestive heart failure, unspecified HF chronicity, unspecified heart failure type  Sleep apnea, unspecified type  Gastroesophageal reflux disease without esophagitis  Asthma, unspecified asthma severity, unspecified whether complicated, unspecified whether persistent  Chronic obstructive pulmonary disease, unspecified COPD type  PE (pulmonary thromboembolism)  DVT (deep venous thrombosis)  TKA    Medications - coumadin et al, please see MAR    Allergies    penicillin (Hives)    SHx - NC    FHx - NC    Vital Signs Last 24 Hrs  T(C): 36.7 (05 May 2018 16:50), Max: 36.7 (05 May 2018 16:50)  T(F): 98 (05 May 2018 16:50), Max: 98 (05 May 2018 16:50)  HR: 92 (05 May 2018 16:50) (92 - 92)  BP: 146/84 (05 May 2018 16:50) (146/84 - 146/84)  RR: 18 (05 May 2018 16:50) (18 - 18)  SpO2: 97% (05 May 2018 16:50) (97% - 97%    Pt seen and examined at bedside  a+ox3, nad, gcs 15, follows complex commands  NC/AT, PERRL  abd - soft, nd, nttp, no numbness +spt  B/L upper extremities - strength 5/5, sensation intact, FROM  LLE - strength 5/5, sensation intact, FROM, no foot drop noted  RLE - +anterior thigh numbness, sensation wnl below the knee, good plantar/dorsiflexion, strength 4/5  back - no cervical or thoracic TTP, +tenderness over lumbosacral spine    DWAIN performed by  - "decreased" rectal tone                          11.8   7.88  )-----------( 204      ( 05 May 2018 19:25 )             36.9             PT/INR - ( 05 May 2018 19:25 )   PT: 38.50 sec;   INR: 3.48 ratio         PTT - ( 05 May 2018 19:25 )  PTT:32.7 sec

## 2018-05-06 LAB
ALBUMIN SERPL ELPH-MCNC: 3.9 G/DL — SIGNIFICANT CHANGE UP (ref 3.5–5.2)
ALP SERPL-CCNC: 49 U/L — SIGNIFICANT CHANGE UP (ref 30–115)
ALT FLD-CCNC: 9 U/L — SIGNIFICANT CHANGE UP (ref 0–41)
ANION GAP SERPL CALC-SCNC: 14 MMOL/L — SIGNIFICANT CHANGE UP (ref 7–14)
AST SERPL-CCNC: 15 U/L — SIGNIFICANT CHANGE UP (ref 0–41)
BASOPHILS # BLD AUTO: 0.01 K/UL — SIGNIFICANT CHANGE UP (ref 0–0.2)
BASOPHILS NFR BLD AUTO: 0.2 % — SIGNIFICANT CHANGE UP (ref 0–1)
BILIRUB SERPL-MCNC: 0.6 MG/DL — SIGNIFICANT CHANGE UP (ref 0.2–1.2)
BUN SERPL-MCNC: 9 MG/DL — LOW (ref 10–20)
CALCIUM SERPL-MCNC: 8.9 MG/DL — SIGNIFICANT CHANGE UP (ref 8.5–10.1)
CHLORIDE SERPL-SCNC: 104 MMOL/L — SIGNIFICANT CHANGE UP (ref 98–110)
CK SERPL-CCNC: 190 U/L — SIGNIFICANT CHANGE UP (ref 0–225)
CO2 SERPL-SCNC: 24 MMOL/L — SIGNIFICANT CHANGE UP (ref 17–32)
CREAT SERPL-MCNC: 0.7 MG/DL — SIGNIFICANT CHANGE UP (ref 0.7–1.5)
CULTURE RESULTS: NO GROWTH — SIGNIFICANT CHANGE UP
EOSINOPHIL # BLD AUTO: 0 K/UL — SIGNIFICANT CHANGE UP (ref 0–0.7)
EOSINOPHIL NFR BLD AUTO: 0 % — SIGNIFICANT CHANGE UP (ref 0–8)
GLUCOSE SERPL-MCNC: 157 MG/DL — HIGH (ref 70–99)
HCT VFR BLD CALC: 38.3 % — SIGNIFICANT CHANGE UP (ref 37–47)
HGB BLD-MCNC: 12.2 G/DL — SIGNIFICANT CHANGE UP (ref 12–16)
IMM GRANULOCYTES NFR BLD AUTO: 0.3 % — SIGNIFICANT CHANGE UP (ref 0.1–0.3)
INR BLD: 3.05 RATIO — HIGH (ref 0.65–1.3)
LYMPHOCYTES # BLD AUTO: 0.82 K/UL — LOW (ref 1.2–3.4)
LYMPHOCYTES # BLD AUTO: 13.5 % — LOW (ref 20.5–51.1)
MAGNESIUM SERPL-MCNC: 2 MG/DL — SIGNIFICANT CHANGE UP (ref 1.8–2.4)
MCHC RBC-ENTMCNC: 26.6 PG — LOW (ref 27–31)
MCHC RBC-ENTMCNC: 31.9 G/DL — LOW (ref 32–37)
MCV RBC AUTO: 83.4 FL — SIGNIFICANT CHANGE UP (ref 81–99)
MONOCYTES # BLD AUTO: 0.1 K/UL — SIGNIFICANT CHANGE UP (ref 0.1–0.6)
MONOCYTES NFR BLD AUTO: 1.7 % — SIGNIFICANT CHANGE UP (ref 1.7–9.3)
NEUTROPHILS # BLD AUTO: 5.11 K/UL — SIGNIFICANT CHANGE UP (ref 1.4–6.5)
NEUTROPHILS NFR BLD AUTO: 84.3 % — HIGH (ref 42.2–75.2)
NT-PROBNP SERPL-SCNC: 27 PG/ML — SIGNIFICANT CHANGE UP (ref 0–300)
PLATELET # BLD AUTO: 223 K/UL — SIGNIFICANT CHANGE UP (ref 130–400)
POTASSIUM SERPL-MCNC: 4.4 MMOL/L — SIGNIFICANT CHANGE UP (ref 3.5–5)
POTASSIUM SERPL-SCNC: 4.4 MMOL/L — SIGNIFICANT CHANGE UP (ref 3.5–5)
PROT SERPL-MCNC: 6.9 G/DL — SIGNIFICANT CHANGE UP (ref 6–8)
PROTHROM AB SERPL-ACNC: 33.7 SEC — HIGH (ref 9.95–12.87)
RBC # BLD: 4.59 M/UL — SIGNIFICANT CHANGE UP (ref 4.2–5.4)
RBC # FLD: 14.6 % — HIGH (ref 11.5–14.5)
SODIUM SERPL-SCNC: 142 MMOL/L — SIGNIFICANT CHANGE UP (ref 135–146)
SPECIMEN SOURCE: SIGNIFICANT CHANGE UP
TROPONIN T SERPL-MCNC: <0.01 NG/ML — SIGNIFICANT CHANGE UP
WBC # BLD: 6.06 K/UL — SIGNIFICANT CHANGE UP (ref 4.8–10.8)
WBC # FLD AUTO: 6.06 K/UL — SIGNIFICANT CHANGE UP (ref 4.8–10.8)

## 2018-05-06 RX ORDER — MORPHINE SULFATE 50 MG/1
2 CAPSULE, EXTENDED RELEASE ORAL EVERY 4 HOURS
Qty: 0 | Refills: 0 | Status: DISCONTINUED | OUTPATIENT
Start: 2018-05-06 | End: 2018-05-07

## 2018-05-06 RX ADMIN — AMLODIPINE BESYLATE 10 MILLIGRAM(S): 2.5 TABLET ORAL at 06:13

## 2018-05-06 RX ADMIN — MORPHINE SULFATE 2 MILLIGRAM(S): 50 CAPSULE, EXTENDED RELEASE ORAL at 16:12

## 2018-05-06 RX ADMIN — SODIUM CHLORIDE 3 MILLILITER(S): 9 INJECTION INTRAMUSCULAR; INTRAVENOUS; SUBCUTANEOUS at 06:18

## 2018-05-06 RX ADMIN — SODIUM CHLORIDE 3 MILLILITER(S): 9 INJECTION INTRAMUSCULAR; INTRAVENOUS; SUBCUTANEOUS at 14:52

## 2018-05-06 RX ADMIN — MORPHINE SULFATE 2 MILLIGRAM(S): 50 CAPSULE, EXTENDED RELEASE ORAL at 15:50

## 2018-05-06 RX ADMIN — BUDESONIDE AND FORMOTEROL FUMARATE DIHYDRATE 2 PUFF(S): 160; 4.5 AEROSOL RESPIRATORY (INHALATION) at 09:23

## 2018-05-06 RX ADMIN — CEFTRIAXONE 100 GRAM(S): 500 INJECTION, POWDER, FOR SOLUTION INTRAMUSCULAR; INTRAVENOUS at 22:50

## 2018-05-06 RX ADMIN — Medication 20 MILLIGRAM(S): at 06:13

## 2018-05-06 RX ADMIN — SODIUM CHLORIDE 3 MILLILITER(S): 9 INJECTION INTRAMUSCULAR; INTRAVENOUS; SUBCUTANEOUS at 22:50

## 2018-05-06 RX ADMIN — Medication 1 SPRAY(S): at 06:16

## 2018-05-06 RX ADMIN — LIDOCAINE 1 PATCH: 4 CREAM TOPICAL at 11:21

## 2018-05-06 RX ADMIN — PANTOPRAZOLE SODIUM 40 MILLIGRAM(S): 20 TABLET, DELAYED RELEASE ORAL at 06:13

## 2018-05-06 RX ADMIN — Medication 1 SPRAY(S): at 17:40

## 2018-05-06 RX ADMIN — BUDESONIDE AND FORMOTEROL FUMARATE DIHYDRATE 2 PUFF(S): 160; 4.5 AEROSOL RESPIRATORY (INHALATION) at 20:47

## 2018-05-06 RX ADMIN — LISINOPRIL 10 MILLIGRAM(S): 2.5 TABLET ORAL at 06:13

## 2018-05-06 NOTE — CONSULT NOTE ADULT - SUBJECTIVE AND OBJECTIVE BOX
HPI:   57 y/o female with presents with low back pain, that started suddenly.   radiating to right leg x 3 days  at baseline patient uses a cane for ambulation     Past Medical History:  Asthma, unspecified asthma severity, unspecified whether complicated, unspecified whether persistent    Atrial fibrillation, unspecified type    Chronic obstructive pulmonary disease, unspecified COPD type    Congestive heart failure, unspecified HF chronicity, unspecified heart failure type    DVT (deep venous thrombosis)    Gastroesophageal reflux disease without esophagitis    PE (pulmonary thromboembolism)    Sleep apnea, unspecified type.    Allergies and Intolerances: penicillin:  Hives  MEDICATIONS  (STANDING):  amLODIPine   Tablet 10 milliGRAM(s) Oral daily  buDESOnide 160 MICROgram(s)/formoterol 4.5 MICROgram(s) Inhaler 2 Puff(s) Inhalation two times a day  cefTRIAXone   IVPB      cefTRIAXone   IVPB 2 Gram(s) IV Intermittent every 24 hours  fluticasone propionate 50 MICROgram(s)/spray Nasal Spray 1 Spray(s) Both Nostrils two times a day  furosemide    Tablet 20 milliGRAM(s) Oral daily  lisinopril 10 milliGRAM(s) Oral daily  montelukast 10 milliGRAM(s) Oral daily  pantoprazole    Tablet 40 milliGRAM(s) Oral before breakfast  sodium chloride 0.9% lock flush 3 milliLiter(s) IV Push every 8 hours    MEDICATIONS  (PRN):  ALBUTerol    90 MICROgram(s) HFA Inhaler 2 Puff(s) Inhalation every 6 hours PRN Bronchospasm  morphine  - Injectable 2 milliGRAM(s) IV Push every 4 hours PRN Severe Pain (7 - 10)  phenazopyridine 100 milliGRAM(s) Oral three times a day PRN dysuria    Vital Signs Last 24 Hrs  T(C): 35.6 (06 May 2018 14:46), Max: 36.7 (05 May 2018 16:50)  T(F): 96.1 (06 May 2018 14:46), Max: 98 (05 May 2018 16:50)  HR: 80 (06 May 2018 14:46) (75 - 92)  BP: 126/59 (06 May 2018 14:46) (121/56 - 146/84)  BP(mean): --  RR: 18 (06 May 2018 14:46) (18 - 18)  SpO2: 95% (05 May 2018 22:45) (95% - 97%)                        12.2   6.06  )-----------( 223      ( 06 May 2018 05:25 )             38.3                     11.8   	7.88  )-----------( 204      ( 05 May 2018 19:25 )  	           36.9     	05 May 2018 19:25    	141    |  102    |  10     	----------------------------<  99     	4.1     |  24     |  0.9      Urine Microscopic-Add On (NC) (05.05.18 @ 21:40)    Red Blood Cell - Urine: 5-10 /HPF    Bacteria: Few /HPF    Epithelial Cells: Occasional /HPF    MR SPINE LUMBAR:  05/05/2018   1.  Lumbar spine degenerative changes notable for:    2.  L3-4 and L4-5 mild spinal stenosis.    3.  L5-S1 moderate/severe bilateral foraminal stenosis. Additional   moderate neural foraminal stenosis on the left at L3-4and bilaterally at   L4-5.    4.  Multilevel facet arthropathy. HPI:   59 y/o female with presents with low back pain, that started suddenly but now it is primarily in right knee.   At baseline patient uses a cane for ambulation   She also reports increase in frequency of urination and discomfort while passing urine.  Past Medical History:  Asthma, unspecified asthma severity, unspecified whether complicated, unspecified whether persistent    Atrial fibrillation, unspecified type    Chronic obstructive pulmonary disease, unspecified COPD type    Congestive heart failure, unspecified HF chronicity, unspecified heart failure type    DVT (deep venous thrombosis)    Gastroesophageal reflux disease without esophagitis    PE (pulmonary thromboembolism)    Sleep apnea, unspecified type.    Allergies and Intolerances: penicillin:  Hives  MEDICATIONS  (STANDING):  amLODIPine   Tablet 10 milliGRAM(s) Oral daily  buDESOnide 160 MICROgram(s)/formoterol 4.5 MICROgram(s) Inhaler 2 Puff(s) Inhalation two times a day  cefTRIAXone   IVPB      cefTRIAXone   IVPB 2 Gram(s) IV Intermittent every 24 hours  fluticasone propionate 50 MICROgram(s)/spray Nasal Spray 1 Spray(s) Both Nostrils two times a day  furosemide    Tablet 20 milliGRAM(s) Oral daily  lisinopril 10 milliGRAM(s) Oral daily  montelukast 10 milliGRAM(s) Oral daily  pantoprazole    Tablet 40 milliGRAM(s) Oral before breakfast  sodium chloride 0.9% lock flush 3 milliLiter(s) IV Push every 8 hours    MEDICATIONS  (PRN):  ALBUTerol    90 MICROgram(s) HFA Inhaler 2 Puff(s) Inhalation every 6 hours PRN Bronchospasm  morphine  - Injectable 2 milliGRAM(s) IV Push every 4 hours PRN Severe Pain (7 - 10)  phenazopyridine 100 milliGRAM(s) Oral three times a day PRN dysuria    Vital Signs Last 24 Hrs  T(C): 35.6 (06 May 2018 14:46), Max: 36.7 (05 May 2018 16:50)  T(F): 96.1 (06 May 2018 14:46), Max: 98 (05 May 2018 16:50)  HR: 80 (06 May 2018 14:46) (75 - 92)  BP: 126/59 (06 May 2018 14:46) (121/56 - 146/84)  BP(mean): --  RR: 18 (06 May 2018 14:46) (18 - 18)  SpO2: 95% (05 May 2018 22:45) (95% - 97%)  Alert, oriented, on RA, Faint crackles, Right knee- no erythema or limitation of range of motion, abd soft, non tender              12.2   6.06  )-----------( 223      ( 06 May 2018 05:25 )             38.3                     11.8   	7.88  )-----------( 204      ( 05 May 2018 19:25 )  	           36.9     	05 May 2018 19:25    	141    |  102    |  10     	----------------------------<  99     	4.1     |  24     |  0.9      Urine Microscopic-Add On (NC) (05.05.18 @ 21:40)    Red Blood Cell - Urine: 5-10 /HPF    Bacteria: Few /HPF    Epithelial Cells: Occasional /HPF    MR SPINE LUMBAR:  05/05/2018   1.  Lumbar spine degenerative changes notable for:    2.  L3-4 and L4-5 mild spinal stenosis.    3.  L5-S1 moderate/severe bilateral foraminal stenosis. Additional   moderate neural foraminal stenosis on the left at L3-4and bilaterally at   L4-5.    4.  Multilevel facet arthropathy.

## 2018-05-07 PROCEDURE — 99221 1ST HOSP IP/OBS SF/LOW 40: CPT

## 2018-05-07 RX ORDER — IBUPROFEN 200 MG
400 TABLET ORAL EVERY 8 HOURS
Qty: 0 | Refills: 0 | Status: DISCONTINUED | OUTPATIENT
Start: 2018-05-07 | End: 2018-05-11

## 2018-05-07 RX ORDER — CIPROFLOXACIN LACTATE 400MG/40ML
250 VIAL (ML) INTRAVENOUS EVERY 12 HOURS
Qty: 0 | Refills: 0 | Status: DISCONTINUED | OUTPATIENT
Start: 2018-05-07 | End: 2018-05-11

## 2018-05-07 RX ORDER — OXYCODONE AND ACETAMINOPHEN 5; 325 MG/1; MG/1
1 TABLET ORAL EVERY 6 HOURS
Qty: 0 | Refills: 0 | Status: DISCONTINUED | OUTPATIENT
Start: 2018-05-07 | End: 2018-05-11

## 2018-05-07 RX ORDER — CIPROFLOXACIN LACTATE 400MG/40ML
200 VIAL (ML) INTRAVENOUS
Qty: 0 | Refills: 0 | Status: DISCONTINUED | OUTPATIENT
Start: 2018-05-07 | End: 2018-05-07

## 2018-05-07 RX ADMIN — Medication 1 SPRAY(S): at 06:21

## 2018-05-07 RX ADMIN — SODIUM CHLORIDE 3 MILLILITER(S): 9 INJECTION INTRAMUSCULAR; INTRAVENOUS; SUBCUTANEOUS at 21:09

## 2018-05-07 RX ADMIN — SODIUM CHLORIDE 3 MILLILITER(S): 9 INJECTION INTRAMUSCULAR; INTRAVENOUS; SUBCUTANEOUS at 14:13

## 2018-05-07 RX ADMIN — Medication 1 SPRAY(S): at 17:18

## 2018-05-07 RX ADMIN — PANTOPRAZOLE SODIUM 40 MILLIGRAM(S): 20 TABLET, DELAYED RELEASE ORAL at 06:20

## 2018-05-07 RX ADMIN — MORPHINE SULFATE 2 MILLIGRAM(S): 50 CAPSULE, EXTENDED RELEASE ORAL at 00:47

## 2018-05-07 RX ADMIN — BUDESONIDE AND FORMOTEROL FUMARATE DIHYDRATE 2 PUFF(S): 160; 4.5 AEROSOL RESPIRATORY (INHALATION) at 08:58

## 2018-05-07 RX ADMIN — BUDESONIDE AND FORMOTEROL FUMARATE DIHYDRATE 2 PUFF(S): 160; 4.5 AEROSOL RESPIRATORY (INHALATION) at 21:08

## 2018-05-07 RX ADMIN — MORPHINE SULFATE 2 MILLIGRAM(S): 50 CAPSULE, EXTENDED RELEASE ORAL at 11:37

## 2018-05-07 RX ADMIN — Medication 20 MILLIGRAM(S): at 06:20

## 2018-05-07 RX ADMIN — MORPHINE SULFATE 2 MILLIGRAM(S): 50 CAPSULE, EXTENDED RELEASE ORAL at 11:07

## 2018-05-07 RX ADMIN — MONTELUKAST 10 MILLIGRAM(S): 4 TABLET, CHEWABLE ORAL at 11:08

## 2018-05-07 RX ADMIN — MORPHINE SULFATE 2 MILLIGRAM(S): 50 CAPSULE, EXTENDED RELEASE ORAL at 00:33

## 2018-05-07 RX ADMIN — AMLODIPINE BESYLATE 10 MILLIGRAM(S): 2.5 TABLET ORAL at 06:20

## 2018-05-07 RX ADMIN — Medication 250 MILLIGRAM(S): at 17:18

## 2018-05-07 RX ADMIN — LISINOPRIL 10 MILLIGRAM(S): 2.5 TABLET ORAL at 06:20

## 2018-05-07 RX ADMIN — SODIUM CHLORIDE 3 MILLILITER(S): 9 INJECTION INTRAMUSCULAR; INTRAVENOUS; SUBCUTANEOUS at 06:53

## 2018-05-07 NOTE — PROGRESS NOTE ADULT - SUBJECTIVE AND OBJECTIVE BOX
SUBJECTIVE:    Patient is a 58y old  Female who presents with a chief complaint of low back pain a/w right lower extremity pain x 3 day duration (05 May 2018 21:06)    Currently admitted to medicine with the primary diagnosis of Back pain     Today is hospital day 2d. This morning she is resting comfortably in bed and reports no new issues or overnight events.     PAST MEDICAL & SURGICAL HISTORY  PAST MEDICAL & SURGICAL HISTORY:  Atrial fibrillation, unspecified type  Congestive heart failure, unspecified HF chronicity, unspecified heart failure type  Sleep apnea, unspecified type  Gastroesophageal reflux disease without esophagitis  Asthma, unspecified asthma severity, unspecified whether complicated, unspecified whether persistent  Chronic obstructive pulmonary disease, unspecified COPD type  PE (pulmonary thromboembolism)  DVT (deep venous thrombosis)  No significant past surgical history    SOCIAL HISTORY:    ALLERGIES:  penicillin (Hives)    MEDICATIONS:  STANDING MEDICATIONS  amLODIPine   Tablet 10 milliGRAM(s) Oral daily  buDESOnide 160 MICROgram(s)/formoterol 4.5 MICROgram(s) Inhaler 2 Puff(s) Inhalation two times a day  ciprofloxacin     Tablet 250 milliGRAM(s) Oral every 12 hours  fluticasone propionate 50 MICROgram(s)/spray Nasal Spray 1 Spray(s) Both Nostrils two times a day  furosemide    Tablet 20 milliGRAM(s) Oral daily  lisinopril 10 milliGRAM(s) Oral daily  montelukast 10 milliGRAM(s) Oral daily  pantoprazole    Tablet 40 milliGRAM(s) Oral before breakfast  sodium chloride 0.9% lock flush 3 milliLiter(s) IV Push every 8 hours    PRN MEDICATIONS  ALBUTerol    90 MICROgram(s) HFA Inhaler 2 Puff(s) Inhalation every 6 hours PRN  ibuprofen  Tablet 400 milliGRAM(s) Oral every 8 hours PRN  oxyCODONE    5 mG/acetaminophen 325 mG 1 Tablet(s) Oral every 6 hours PRN  phenazopyridine 100 milliGRAM(s) Oral three times a day PRN    VITALS:   T(F): 98.6  HR: 76  BP: 121/65  RR: 18  SpO2: 95%    LABS:                        12.2   6.06  )-----------( 223      ( 06 May 2018 05:25 )             38.3     05-06    142  |  104  |  9<L>  ----------------------------<  157<H>  4.4   |  24  |  0.7    Ca    8.9      06 May 2018 05:25  Mg     2.0     05-    TPro  6.9  /  Alb  3.9  /  TBili  0.6  /  DBili  x   /  AST  15  /  ALT  9   /  AlkPhos  49  05-06    PT/INR - ( 06 May 2018 05:25 )   PT: 33.70 sec;   INR: 3.05 ratio         PTT - ( 05 May 2018 19:25 )  PTT:32.7 sec  Urinalysis Basic - ( 05 May 2018 21:40 )    Color: Yellow / Appearance: Clear / S.015 / pH: x  Gluc: x / Ketone: Negative  / Bili: Negative / Urobili: 0.2 mg/dL   Blood: x / Protein: Negative mg/dL / Nitrite: Negative   Leuk Esterase: Negative / RBC: 5-10 /HPF / WBC x   Sq Epi: x / Non Sq Epi: Occasional /HPF / Bacteria: Few /HPF            Culture - Blood (collected 06 May 2018 05:25)  Source: .Blood None  Preliminary Report (07 May 2018 10:01):    No growth to date.    Culture - Urine (collected 05 May 2018 21:42)  Source: .Urine Clean Catch (Midstream)  Final Report (06 May 2018 21:57):    No growth      CARDIAC MARKERS ( 06 May 2018 05:25 )    x     / <0.01 ng/mL / 190 U/L / x     / x          RADIOLOGY:    < from: MR Lumbar Spine No Cont (18 @ 22:00) >  EXAM:  MR SPINE LUMBAR            PROCEDURE DATE:  2018            INTERPRETATION:  Clinical History / Reason for exam: Lower back pain   radiating to right lower leg.    Technique: MRI lumbar spine without contrast. Multiplanar multisequence  MRI of the lumbar spine was performed without intravenous contrast on a 3   [place] magnet.    Correlation made with a CT of the abdomen and pelvis 2012    Findings:    The lumbar vertebral bodies maintain normal height and alignment.    Bone marrow signal demonstrates endplate degenerative changes across L3-4   and L5-S1. There is an L3 inferior endplate Schmorl's node.    The conus medullaris terminates at the L1 level and is normal in signal   morphology.    There is multilevel disc desiccation with mild degenerative loss of disc   space height.    At T11-12 there is a small central disc herniation mildly indenting the   ventral thecal sac without cord compression (sagittal plane only).    At T12-L1 there is no significant disc herniation, canal or neural   foraminal stenosis.    At L1-2 there is a trace disc bulge without significant canal or   foraminal stenosis.    At L2-3 there is a small disc bulge and facet hypertrophy. There is mild   bilateral foraminal stenosis.     At L3-4 there is a small disc bulge and facet hypertrophy. There is mild   spinal and mild right and moderate left foraminal stenosis.    At L4-5 there is a moderate disc bulge and facet and ligamentous   hypertrophy. There is mild spinal stenosis and moderate bilateral   foraminal narrowing slightly worse on the left.    At L5-S1 there is a small disc bulge with lateral extension into the   neural foramen and facet hypertrophy. There is no significant spinal   stenosis. There is moderate to severe bilateral foraminal stenosis.    There is high T2 signal within the left facet joint at L2-3, bilateral   facet joints at L3-4 and the left facet joint at L1 L4 5 compatible facet   arthropathy. Small external synovial cyst noted behind the left L5-S1   facet joint.    IMPRESSION:    1.  Lumbar spine degenerative changes notable for:    2.  L3-4 and L4-5 mild spinal stenosis.    3.  L5-S1 moderate/severe bilateral foraminal stenosis. Additional   moderate neural foraminal stenosis on the left at L3-4and bilaterally at   L4-5.    4.  Multilevel facet arthropathy.                  VINCENT KEY M.D., ATTENDING RADIOLOGIST  This document has been electronically signed. May  6 2018  9:48AM    < end of copied text >  PHYSICAL EXAM:  GEN: No acute distress  HEENT: NC/AT  LUNGS: Clear to auscultation bilaterally   HEART: S1/S2 present. RRR.   ABD: Soft, mildly tender to suprapubic region, non-distended. Bowel sounds present  EXT: no edema, RLE chronic arthritic changes, limited movement 2/2 pain, +SLR  NEURO: AAOX3, pleasant, appropriate

## 2018-05-07 NOTE — CONSULT NOTE ADULT - SUBJECTIVE AND OBJECTIVE BOX
Patient is a 58y old  Female who presents with a chief complaint of low back pain a/w right lower extremity pain x 3 day duration (05 May 2018 21:06)    HPI:  57 y/o female with history of asthma , DVT/PE on coumadin , copd, htn , YOLIE on cpap , presented to ER for c/o low back pain radiating to right leg x 3 days  As per patient back pain was sudden in onset , first noticed 3 days ptp , sharp shooting type, 8/10 in intensity worsened by walking and movement .No h/o trauma or fall .denies urinary or fecal incontinence , fever, chills , diarrhea , constipation .  does c/o dysuria x 3 days   denies any history of heavy weight lifting or recent strenuous activity , no h/o similar complaints in the past  at baseline patient uses a cane for ambulation (05 May 2018 21:06)      PAST MEDICAL & SURGICAL HISTORY:  Atrial fibrillation, unspecified type  Congestive heart failure, unspecified HF chronicity, unspecified heart failure type  Sleep apnea, unspecified type  Gastroesophageal reflux disease without esophagitis  Asthma, unspecified asthma severity, unspecified whether complicated, unspecified whether persistent  Chronic obstructive pulmonary disease, unspecified COPD type  PE (pulmonary thromboembolism)  DVT (deep venous thrombosis)  No significant past surgical history  Hx L TKR    Hospital Course: Seen by Neurosurgery- Patient's right lower extremity pain is at least partially related to her known knee arthritis which needs replacement surgery according to the patient. She also appears to have L4 distribution pain related to the moderate L4 foraminal stenosis although the MRI suggested worse findings on the left and at L5. She also complains of right hip and groin pain.  Recommend pain management consult for possible epidural injection. Consult Orthopedics for the knee and possibly hip pain. X-ray of right hip.    on antibiotics secondary to cystitis    TODAY'S SUBJECTIVE & REVIEW OF SYMPTOMS:     Constitutional WNL   Cardio WNL   Resp WNL   GI WNL  Heme WNL  Endo WNL  Skin WNL  MSK LBP, R groin/ R knee pain  Neuro WNL  Cognitive WNL  Psych WNL      MEDICATIONS  (STANDING):  amLODIPine   Tablet 10 milliGRAM(s) Oral daily  buDESOnide 160 MICROgram(s)/formoterol 4.5 MICROgram(s) Inhaler 2 Puff(s) Inhalation two times a day  ciprofloxacin     Tablet 250 milliGRAM(s) Oral every 12 hours  fluticasone propionate 50 MICROgram(s)/spray Nasal Spray 1 Spray(s) Both Nostrils two times a day  furosemide    Tablet 20 milliGRAM(s) Oral daily  lisinopril 10 milliGRAM(s) Oral daily  montelukast 10 milliGRAM(s) Oral daily  pantoprazole    Tablet 40 milliGRAM(s) Oral before breakfast  sodium chloride 0.9% lock flush 3 milliLiter(s) IV Push every 8 hours    MEDICATIONS  (PRN):  ALBUTerol    90 MICROgram(s) HFA Inhaler 2 Puff(s) Inhalation every 6 hours PRN Bronchospasm  morphine  - Injectable 2 milliGRAM(s) IV Push every 4 hours PRN Severe Pain (7 - 10)  phenazopyridine 100 milliGRAM(s) Oral three times a day PRN dysuria      FAMILY HISTORY:  Family history of blood clots (Sibling)      Allergies    penicillin (Hives)    Intolerances        SOCIAL HISTORY:    [    ] Etoh  [    ] Smoking  [    ] Substance abuse     Home Environment:  [    ] Home Alone  [  x  ] Lives with Family  [facility]  [    ] Home Health Aid    Dwelling:  [    ] Apartment  [ x   ] Private House  [    ] Adult Home  [    ] Skilled Nursing Facility      [    ] Short Term  [    ] Long Term  [   x ] Stairs     RAMP                     [    ] Elevator     FUNCTIONAL STATUS PTA: (Check all that apply)  Ambulation: [ x    ]Independent    [    ] Dependent     [    ] Non-Ambulatory  Assistive Device: [ x   ] SA Cane  [    ]  Q Cane  [    ] Walker  [    ]  Wheelchair  ADL : [    ] Independent  [    ]  Dependent       Vital Signs Last 24 Hrs  T(C): 36.1 (07 May 2018 05:55), Max: 36.7 (06 May 2018 21:24)  T(F): 97 (07 May 2018 05:55), Max: 98.1 (06 May 2018 21:24)  HR: 72 (07 May 2018 05:55) (72 - 86)  BP: 114/67 (07 May 2018 05:55) (114/67 - 126/59)  BP(mean): --  RR: 18 (07 May 2018 05:55) (18 - 18)  SpO2: 95% (06 May 2018 19:30) (95% - 95%)      PHYSICAL EXAM: Alert & Oriented X3  GENERAL: NAD, well-groomed, well-developed  HEAD:  Atraumatic, Normocephalic  EYES: EOMI, PERRLA, conjunctiva and sclera clear  NECK: Supple, No JVD, Normal thyroid  CHEST/LUNG: Clear to percussion bilaterally; No rales, rhonchi, wheezing, or rubs  HEART: Regular rate and rhythm; No murmurs, rubs, or gallops  ABDOMEN: Soft, Nontender, Nondistended; Bowel sounds present  EXTREMITIES:  2+ Peripheral Pulses, No clubbing, cyanosis, or edema    NERVOUS SYSTEM:  Cranial Nerves 2-12 intact [  x  ] Abnormal  [    ]  ROM: WFL all extremities [ x   ]  Abnormal [     ]  Motor Strength: WFL all extremities  [ x   ]  Abnormal [    ]  Sensation: intact to light touch [  x  ] Abnormal [    ]  Reflexes: Symmetric [ x   ]  Abnormal [    ]  NO LIMITED INT ROTATION R HIP COMPARED TO L - MODIFIED LASEGUE NEGATIVE  FUNCTIONAL STATUS:  Bed Mobility: [   ]  Independent [    ]  Supervision [ x   ]  Needs Assistance [  ]  N/A  Transfers: [    ]  Independent [    ]  Supervision [  x  ]  Needs Assistance [    ]  N/A    Ambulation:  [    ]  Independent [    ]  Supervision [ x   ]  Needs Assistance [    ]  N/A   ADL:  [    ]   Independent [  x  ] Requires Assistance [    ] N/A   MIN A RW 20' x2    LABS:                        12.2   6.06  )-----------( 223      ( 06 May 2018 05:25 )             38.3     05-06    142  |  104  |  9<L>  ----------------------------<  157<H>  4.4   |  24  |  0.7    Ca    8.9      06 May 2018 05:25  Mg     2.0     05-06    TPro  6.9  /  Alb  3.9  /  TBili  0.6  /  DBili  x   /  AST  15  /  ALT  9   /  AlkPhos  49  05-06    PT/INR - ( 06 May 2018 05:25 )   PT: 33.70 sec;   INR: 3.05 ratio         PTT - ( 05 May 2018 19:25 )  PTT:32.7 sec  Urinalysis Basic - ( 05 May 2018 21:40 )    Color: Yellow / Appearance: Clear / S.015 / pH: x  Gluc: x / Ketone: Negative  / Bili: Negative / Urobili: 0.2 mg/dL   Blood: x / Protein: Negative mg/dL / Nitrite: Negative   Leuk Esterase: Negative / RBC: 5-10 /HPF / WBC x   Sq Epi: x / Non Sq Epi: Occasional /HPF / Bacteria: Few /HPF        RADIOLOGY & ADDITIONAL STUDIES:

## 2018-05-07 NOTE — PROGRESS NOTE ADULT - SUBJECTIVE AND OBJECTIVE BOX
INTERVAL HPI/OVERNIGHT EVENTS:  Stable.   Allergies and Intolerances: penicillin:  Hives    Alert, oriented, on RA, Faint crackles, Right knee- no erythema or limitation of range of motion, abd soft, non tender     Vital Signs Last 24 Hrs  T(C): 36.1 (07 May 2018 05:55), Max: 36.7 (06 May 2018 21:24)  T(F): 97 (07 May 2018 05:55), Max: 98.1 (06 May 2018 21:24)  HR: 72 (07 May 2018 05:55) (72 - 86)  BP: 114/67 (07 May 2018 05:55) (114/67 - 126/59)  BP(mean): --  RR: 18 (07 May 2018 05:55) (18 - 18)  SpO2: 95% (06 May 2018 19:30) (95% - 95%)    18 @ 07:01  -  18 @ 11:42  --------------------------------------------------------  IN: 210 mL / OUT: 0 mL / NET: 210 mL    ALBUTerol    90 MICROgram(s) HFA Inhaler 2 Puff(s) Inhalation every 6 hours PRN  amLODIPine   Tablet 10 milliGRAM(s) Oral daily  buDESOnide 160 MICROgram(s)/formoterol 4.5 MICROgram(s) Inhaler 2 Puff(s) Inhalation two times a day  ciprofloxacin     Tablet 250 milliGRAM(s) Oral every 12 hours  fluticasone propionate 50 MICROgram(s)/spray Nasal Spray 1 Spray(s) Both Nostrils two times a day  furosemide    Tablet 20 milliGRAM(s) Oral daily  lisinopril 10 milliGRAM(s) Oral daily  montelukast 10 milliGRAM(s) Oral daily  morphine  - Injectable 2 milliGRAM(s) IV Push every 4 hours PRN  pantoprazole    Tablet 40 milliGRAM(s) Oral before breakfast  phenazopyridine 100 milliGRAM(s) Oral three times a day PRN  sodium chloride 0.9% lock flush 3 milliLiter(s) IV Push every 8 hours  LABS:                        12.2   6.06  )-----------( 223      ( 06 May 2018 05:25 )             38.3         142  |  104  |  9<L>  ----------------------------<  157<H>  4.4   |  24  |  0.7    Urinalysis Basic - ( 05 May 2018 21:40 )  Color: Yellow / Appearance: Clear / S.015 / pH: x  Gluc: x / Ketone: Negative  / Bili: Negative / Urobili: 0.2 mg/dL   Blood: x / Protein: Negative mg/dL / Nitrite: Negative   Leuk Esterase: Negative / RBC: 5-10 /HPF / WBC x   Sq Epi: x / Non Sq Epi: Occasional /HPF / Bacteria: Few /HPF    Culture - Blood (collected 06 May 2018 05:25)  Source: .Blood None  Preliminary Report (07 May 2018 10:01):    No growth to date.    Culture - Urine (collected 05 May 2018 21:42)  Source: .Urine Clean Catch (Midstream)  Final Report (06 May 2018 21:57):    No growth    MR SPINE LUMBAR:  2018   1.  Lumbar spine degenerative changes notable for:  2.  L3-4 and L4-5 mild spinal stenosis.  3.  L5-S1 moderate/severe bilateral foraminal stenosis. Additional   moderate neural foraminal stenosis on the left at L3-4and bilaterally at   L4-5.  Multilevel facet arthropathy. INTERVAL HPI/OVERNIGHT EVENTS:  Stable.   Allergies and Intolerances: penicillin:  Hives    Alert, oriented, OOB to chair, Faint crackles, Right knee-less pain than yesterday, abd soft, non tender     Vital Signs Last 24 Hrs  T(C): 36.1 (07 May 2018 05:55), Max: 36.7 (06 May 2018 21:24)  T(F): 97 (07 May 2018 05:55), Max: 98.1 (06 May 2018 21:24)  HR: 72 (07 May 2018 05:55) (72 - 86)  BP: 114/67 (07 May 2018 05:55) (114/67 - 126/59)  BP(mean): --  RR: 18 (07 May 2018 05:55) (18 - 18)  SpO2: 95% (06 May 2018 19:30) (95% - 95%)    18 @ 07:01  -  18 @ 11:42  --------------------------------------------------------  IN: 210 mL / OUT: 0 mL / NET: 210 mL    ALBUTerol    90 MICROgram(s) HFA Inhaler 2 Puff(s) Inhalation every 6 hours PRN  amLODIPine   Tablet 10 milliGRAM(s) Oral daily  buDESOnide 160 MICROgram(s)/formoterol 4.5 MICROgram(s) Inhaler 2 Puff(s) Inhalation two times a day  ciprofloxacin     Tablet 250 milliGRAM(s) Oral every 12 hours  fluticasone propionate 50 MICROgram(s)/spray Nasal Spray 1 Spray(s) Both Nostrils two times a day  furosemide    Tablet 20 milliGRAM(s) Oral daily  lisinopril 10 milliGRAM(s) Oral daily  montelukast 10 milliGRAM(s) Oral daily  morphine  - Injectable 2 milliGRAM(s) IV Push every 4 hours PRN  pantoprazole    Tablet 40 milliGRAM(s) Oral before breakfast  phenazopyridine 100 milliGRAM(s) Oral three times a day PRN  sodium chloride 0.9% lock flush 3 milliLiter(s) IV Push every 8 hours  LABS:                        12.2   6.06  )-----------( 223      ( 06 May 2018 05:25 )             38.3         142  |  104  |  9<L>  ----------------------------<  157<H>  4.4   |  24  |  0.7    Urinalysis Basic - ( 05 May 2018 21:40 )  Color: Yellow / Appearance: Clear / S.015 / pH: x  Gluc: x / Ketone: Negative  / Bili: Negative / Urobili: 0.2 mg/dL   Blood: x / Protein: Negative mg/dL / Nitrite: Negative   Leuk Esterase: Negative / RBC: 5-10 /HPF / WBC x   Sq Epi: x / Non Sq Epi: Occasional /HPF / Bacteria: Few /HPF    Culture - Blood (collected 06 May 2018 05:25)  Source: .Blood None  Preliminary Report (07 May 2018 10:01):    No growth to date.    Culture - Urine (collected 05 May 2018 21:42)  Source: .Urine Clean Catch (Midstream)  Final Report (06 May 2018 21:57):    No growth    MR SPINE LUMBAR:  2018   1.  Lumbar spine degenerative changes notable for:  2.  L3-4 and L4-5 mild spinal stenosis.  3.  L5-S1 moderate/severe bilateral foraminal stenosis. Additional   moderate neural foraminal stenosis on the left at L3-4and bilaterally at   L4-5.  Multilevel facet arthropathy.

## 2018-05-07 NOTE — PROGRESS NOTE ADULT - SUBJECTIVE AND OBJECTIVE BOX
SUKHJINDER RUIZ  58y  Female      Patient is a 58y old  Female who presents with a chief complaint of low back pain a/w right lower extremity pain x 3 day duration (05 May 2018 21:06)      INTERVAL HPI/OVERNIGHT EVENTS:      ******************************* REVIEW OF SYSTEMS:**********************************************      All other review of systems negative    *********************** VITALS ******************************************    T(F): 97 (18 @ 05:55)  HR: 72 (18 @ 05:55) (72 - 86)  BP: 114/67 (18 @ 05:55) (114/67 - 126/59)  RR: 18 (18 @ 05:55) (18 - 18)  SpO2: 95% (18 @ 19:30) (95% - 95%)    18 @ 07:01  -  18 @ 11:54  --------------------------------------------------------  IN: 210 mL / OUT: 0 mL / NET: 210 mL            18 @ 07:01  -  18 @ 11:54  --------------------------------------------------------  IN: 210 mL / OUT: 0 mL / NET: 210 mL        ******************************** PHYSICAL EXAM:**************************************************  GENERAL: NAD    PSYCH:  HEENT:     NERVOUS SYSTEM:  Alert & Oriented X3,     PULMONARY: GABRIELLE, CTA    CARDIOVASCULAR: S1S2 RRR    GI: Soft, NT, ND; BS present.    EXTREMITIES:  2+ Peripheral Pulses, No clubbing, cyanosis, or edema    LYMPH: No lymphadenopathy noted    SKIN: No rashes or lesions    ******************************************************************************************    Consultant(s) Notes Reviewed:  [x ] YES  [ ] NO    Discussed with Consultants/Other Providers [ x] YES     **************************** LABS *******************************************************                          12.2     )-----------( 223      ( 06 May 2018 05:25 )             38.3         142  |  104  |  9<L>  ----------------------------<  157<H>  4.4   |  24  |  0.7    Ca    8.9      06 May 2018 05:25  Mg     2.0         TPro  6.9  /  Alb  3.9  /  TBili  0.6  /  DBili  x   /  AST  15  /  ALT  9   /  AlkPhos  49        Urinalysis Basic - ( 05 May 2018 21:40 )    Color: Yellow / Appearance: Clear / S.015 / pH: x  Gluc: x / Ketone: Negative  / Bili: Negative / Urobili: 0.2 mg/dL   Blood: x / Protein: Negative mg/dL / Nitrite: Negative   Leuk Esterase: Negative / RBC: 5-10 /HPF / WBC x   Sq Epi: x / Non Sq Epi: Occasional /HPF / Bacteria: Few /HPF      PT/INR - ( 06 May 2018 05:25 )   PT: 33.70 sec;   INR: 3.05 ratio         PTT - ( 05 May 2018 19:25 )  PTT:32.7 sec  Lactate Trend    CARDIAC MARKERS ( 06 May 2018 05:25 )  x     / <0.01 ng/mL / 190 U/L / x     / x          CAPILLARY BLOOD GLUCOSE              **************************Active Medications *******************************************  penicillin (Hives)      ALBUTerol    90 MICROgram(s) HFA Inhaler 2 Puff(s) Inhalation every 6 hours PRN  amLODIPine   Tablet 10 milliGRAM(s) Oral daily  buDESOnide 160 MICROgram(s)/formoterol 4.5 MICROgram(s) Inhaler 2 Puff(s) Inhalation two times a day  ciprofloxacin     Tablet 250 milliGRAM(s) Oral every 12 hours  fluticasone propionate 50 MICROgram(s)/spray Nasal Spray 1 Spray(s) Both Nostrils two times a day  furosemide    Tablet 20 milliGRAM(s) Oral daily  lisinopril 10 milliGRAM(s) Oral daily  montelukast 10 milliGRAM(s) Oral daily  morphine  - Injectable 2 milliGRAM(s) IV Push every 4 hours PRN  pantoprazole    Tablet 40 milliGRAM(s) Oral before breakfast  phenazopyridine 100 milliGRAM(s) Oral three times a day PRN  sodium chloride 0.9% lock flush 3 milliLiter(s) IV Push every 8 hours      ***************************************************  RADIOLOGY & ADDITIONAL TESTS:    Imaging Personally Reviewed:  [ ] YES  [ ] NO    HEALTH ISSUES - PROBLEM Dx:

## 2018-05-07 NOTE — PROGRESS NOTE ADULT - ASSESSMENT
# Cystitis without evidence of pyelonephritis:   - Culture negative  - PCN allergy so ciprofloxacin for 3 more days is appropriate.     # Right knee arthritis: No evidence of septic arthritis.     # YOLIE, using Bipap at nights but no continuous O2 / CHF/ COPD/ A. fibrillation / PCN allergy / H/O PE. # Cystitis without evidence of pyelonephritis:   - Culture negative  - PCN allergy so ciprofloxacin for 3 more days is appropriate.     # Right knee arthritis: Better today.      # YOLIE, using Bipap at nights but no continuous O2 / CHF/ COPD/ A. fibrillation / PCN allergy / H/O PE.

## 2018-05-07 NOTE — PROGRESS NOTE ADULT - ASSESSMENT
58F with pmh copd, dvt/pe on coumadin, htn, blaine on cpap, DMII presented with c/o LBP radiating to RLE x3 days.    1)LBP radiating to RLE - physical exam significant for + SLR RLE; no fecal or urinary incontinence, no saddle anesthesia, likely 2/2 spinal stenosis seen on MRI  -neurosurgery following, MRI results reviewed  -pain c/s pending, neurosurgery recommended ?epidural injection  -for now, patient started on po ibuprofen and percocet prn for pain control  -PT/Rehab saw patient, recommended STR    2)dysuria with +ve UA and lower abdominal pain, suprapubic tenderness  -switched to po cipro today per ID for cystitis  -ucx and blood cx - negative to date  -US abdomen reviewed    3)DVT/PE on coumadin  -INR supratherapeutic, will f/u INR in AM and dose coumadin if appropriate    4)asthma/copd, stable  -c/w duonebs, symbicort     5)BLAINE   -c/w cpap     6)DMII  -monitor FS for now    DVT PPX coumadin  Full code

## 2018-05-07 NOTE — PROGRESS NOTE ADULT - ASSESSMENT
57 y/o female with history of copd , dvt/pe  on coumadin ,htn , blaine on cpap ,dm-2 Presented with c/o LBP radiating to RLE x 3 days     1)LBP radiating to RLE - physical exam significant for + SLR right  ; no fecal or urinary incontinence , no saddle anesthesia , likely     2/2 Spinal stenosis.   neurosurgery consult - f/u recs  PT/Rehab  pain control , would taper down IV morphine, start anti-inflammatory/ steroid.      2)dysuria with +ve UA and lower abdomen pain tenderness - treat for cystitis   s/p iv rocephin 2 gm iv q24, would switch to PO abx.   urine and blood cx - negative.    3)DVT/PE on coumadin  inr supratherapeutic - hold coumadin tonight , will resume in am as per inr    4)asthma / copd - possible  ACOS ? - stable   c/w duonebs , symbicort     5)blaine on cpap   6)dm-2 - monitor FS and initiate insulin sliding scale if FS >140  7)dvt ppx- patient on coumadin for dvt treatment   8)full code from home      Functional decline 2/2 LBP  PT REHAB.

## 2018-05-07 NOTE — PHYSICAL THERAPY INITIAL EVALUATION ADULT - GENERAL OBSERVATIONS, REHAB EVAL
11:15-11:54. Pt encountered semifowler in bed in NAD, reports R LE pain in knee and groin region, reports she was given morphine earlier, agreeable to PT.

## 2018-05-08 LAB
ANION GAP SERPL CALC-SCNC: 14 MMOL/L — SIGNIFICANT CHANGE UP (ref 7–14)
BUN SERPL-MCNC: 10 MG/DL — SIGNIFICANT CHANGE UP (ref 10–20)
CALCIUM SERPL-MCNC: 8.6 MG/DL — SIGNIFICANT CHANGE UP (ref 8.5–10.1)
CHLORIDE SERPL-SCNC: 106 MMOL/L — SIGNIFICANT CHANGE UP (ref 98–110)
CO2 SERPL-SCNC: 25 MMOL/L — SIGNIFICANT CHANGE UP (ref 17–32)
CREAT SERPL-MCNC: 0.7 MG/DL — SIGNIFICANT CHANGE UP (ref 0.7–1.5)
GLUCOSE SERPL-MCNC: 113 MG/DL — HIGH (ref 70–99)
HCT VFR BLD CALC: 36.9 % — LOW (ref 37–47)
HGB BLD-MCNC: 11.8 G/DL — LOW (ref 12–16)
INR BLD: 2.03 RATIO — HIGH (ref 0.65–1.3)
MCHC RBC-ENTMCNC: 26.9 PG — LOW (ref 27–31)
MCHC RBC-ENTMCNC: 32 G/DL — SIGNIFICANT CHANGE UP (ref 32–37)
MCV RBC AUTO: 84.2 FL — SIGNIFICANT CHANGE UP (ref 81–99)
NRBC # BLD: 0 /100 WBCS — SIGNIFICANT CHANGE UP (ref 0–0)
PLATELET # BLD AUTO: 204 K/UL — SIGNIFICANT CHANGE UP (ref 130–400)
POTASSIUM SERPL-MCNC: 4.1 MMOL/L — SIGNIFICANT CHANGE UP (ref 3.5–5)
POTASSIUM SERPL-SCNC: 4.1 MMOL/L — SIGNIFICANT CHANGE UP (ref 3.5–5)
PROTHROM AB SERPL-ACNC: 22.2 SEC — HIGH (ref 9.95–12.87)
RBC # BLD: 4.38 M/UL — SIGNIFICANT CHANGE UP (ref 4.2–5.4)
RBC # FLD: 14.6 % — HIGH (ref 11.5–14.5)
SODIUM SERPL-SCNC: 145 MMOL/L — SIGNIFICANT CHANGE UP (ref 135–146)
WBC # BLD: 5.79 K/UL — SIGNIFICANT CHANGE UP (ref 4.8–10.8)
WBC # FLD AUTO: 5.79 K/UL — SIGNIFICANT CHANGE UP (ref 4.8–10.8)

## 2018-05-08 RX ORDER — WARFARIN SODIUM 2.5 MG/1
5 TABLET ORAL ONCE
Qty: 0 | Refills: 0 | Status: COMPLETED | OUTPATIENT
Start: 2018-05-08 | End: 2018-05-08

## 2018-05-08 RX ADMIN — LISINOPRIL 10 MILLIGRAM(S): 2.5 TABLET ORAL at 06:34

## 2018-05-08 RX ADMIN — OXYCODONE AND ACETAMINOPHEN 1 TABLET(S): 5; 325 TABLET ORAL at 09:18

## 2018-05-08 RX ADMIN — SODIUM CHLORIDE 3 MILLILITER(S): 9 INJECTION INTRAMUSCULAR; INTRAVENOUS; SUBCUTANEOUS at 13:35

## 2018-05-08 RX ADMIN — Medication 1 SPRAY(S): at 06:34

## 2018-05-08 RX ADMIN — PANTOPRAZOLE SODIUM 40 MILLIGRAM(S): 20 TABLET, DELAYED RELEASE ORAL at 06:34

## 2018-05-08 RX ADMIN — AMLODIPINE BESYLATE 10 MILLIGRAM(S): 2.5 TABLET ORAL at 06:33

## 2018-05-08 RX ADMIN — WARFARIN SODIUM 5 MILLIGRAM(S): 2.5 TABLET ORAL at 22:38

## 2018-05-08 RX ADMIN — SODIUM CHLORIDE 3 MILLILITER(S): 9 INJECTION INTRAMUSCULAR; INTRAVENOUS; SUBCUTANEOUS at 22:14

## 2018-05-08 RX ADMIN — Medication 1 SPRAY(S): at 17:32

## 2018-05-08 RX ADMIN — BUDESONIDE AND FORMOTEROL FUMARATE DIHYDRATE 2 PUFF(S): 160; 4.5 AEROSOL RESPIRATORY (INHALATION) at 22:37

## 2018-05-08 RX ADMIN — SODIUM CHLORIDE 3 MILLILITER(S): 9 INJECTION INTRAMUSCULAR; INTRAVENOUS; SUBCUTANEOUS at 09:17

## 2018-05-08 RX ADMIN — Medication 250 MILLIGRAM(S): at 17:32

## 2018-05-08 RX ADMIN — Medication 250 MILLIGRAM(S): at 06:33

## 2018-05-08 RX ADMIN — Medication 20 MILLIGRAM(S): at 06:34

## 2018-05-08 RX ADMIN — BUDESONIDE AND FORMOTEROL FUMARATE DIHYDRATE 2 PUFF(S): 160; 4.5 AEROSOL RESPIRATORY (INHALATION) at 09:18

## 2018-05-08 RX ADMIN — MONTELUKAST 10 MILLIGRAM(S): 4 TABLET, CHEWABLE ORAL at 11:24

## 2018-05-08 RX ADMIN — OXYCODONE AND ACETAMINOPHEN 1 TABLET(S): 5; 325 TABLET ORAL at 06:39

## 2018-05-08 NOTE — PROGRESS NOTE ADULT - ASSESSMENT
58F with pmh copd, dvt/pe on coumadin, htn, blaine on cpap, DMII presented with c/o LBP radiating to RLE x3 days.    1)LBP radiating to RLE - physical exam significant for + SLR RLE; no fecal or urinary incontinence, no saddle anesthesia, likely 2/2 spinal stenosis seen on MRI  -neurosurgery following, MRI results reviewed  -pain c/s recommended gabapentin for pain control, will start today and titrate as tolerated  -PT/Rehab saw patient, recommended STR    2)dysuria with +ve UA and lower abdominal pain, suprapubic tenderness  -switched to po cipro yesterday per ID for cystitis  -ucx and blood cx - negative to date  -US abdomen reviewed    3)DVT/PE on coumadin  -INR therapeutic this AM, will resume coumadin tonight    4)asthma/copd, stable  -c/w duonebs, symbicort     5)BLAINE   -c/w cpap     6)DMII  -monitor FS for now    DVT PPX coumadin  Full code 58F with pmh copd, dvt/pe on coumadin, htn, blaine on cpap, DMII presented with c/o LBP radiating to RLE x3 days.    1)LBP radiating to RLE - physical exam significant for + SLR RLE; no fecal or urinary incontinence, no saddle anesthesia, likely 2/2 spinal stenosis seen on MRI  -neurosurgery following, MRI results reviewed  -pain c/s recommended gabapentin for pain control, will start today and titrate as tolerated  -PT/Rehab saw patient, recommended STR    2)dysuria with +ve UA and lower abdominal pain, suprapubic tenderness  -switched to po cipro yesterday per ID for cystitis  -ucx and blood cx - negative to date  -US abdomen reviewed    3)Chronic DVT/PE on coumadin  -INR therapeutic this AM, will resume coumadin tonight    4)asthma/copd, stable  -c/w duonebs, symbicort     5)BLAINE   -c/w cpap     6)DMII  -monitor FS for now    DVT PPX coumadin  Full code

## 2018-05-08 NOTE — PROGRESS NOTE ADULT - ASSESSMENT
59 y/o female with history of copd , dvt/pe  on coumadin ,htn , blaine on cpap ,dm-2 Presented with c/o LBP radiating to RLE x 3 days     1)Functional decline 2/2 LBP radiating to RLE -    2/2 Spinal stenosis.   neurosurgery consult - f/u recs  PT/Rehab  pain control , would taper down/off  IV morphine, start anti-inflammatory/ steroid.  Pain mgmt eval noted and appreciated.  would add Neurontin. Hold off Amytriptine at this time.      2) Possible cystitis   s/p iv rocephin ,  switched to PO abx. cipro.  Urine and blood cx - negative.  Agree with adding Pyridium.    3)DVT/PE on coumadin  INR therapeutic -  will resume Coumadin.    4)asthma / copd - possible  ACOS ? - stable   c/w duonebs , symbicort     5)BLAINE on cpap     6)dm-2 - monitor FS and initiate insulin sliding scale if FS >140  7)dvt ppx- patient on coumadin for dvt treatment   8)full code from home      Functional decline 2/2 LBP  PT REHAB.

## 2018-05-08 NOTE — PROGRESS NOTE ADULT - SUBJECTIVE AND OBJECTIVE BOX
SUBJECTIVE:    Patient is a 58y old  Female who presents with a chief complaint of low back pain a/w right lower extremity pain x 3 day duration (05 May 2018 21:06)    Currently admitted to medicine with the primary diagnosis of Back pain     Today is hospital day 3d. This morning she is resting comfortably in bed and reports no new issues or overnight events. Her RLE pain is still in pain, but she reports improvement with her current pain regimen. She worked with PT today and was able to transfer to chair with assistance.    PAST MEDICAL & SURGICAL HISTORY  PAST MEDICAL & SURGICAL HISTORY:  Atrial fibrillation, unspecified type  Congestive heart failure, unspecified HF chronicity, unspecified heart failure type  Sleep apnea, unspecified type  Gastroesophageal reflux disease without esophagitis  Asthma, unspecified asthma severity, unspecified whether complicated, unspecified whether persistent  Chronic obstructive pulmonary disease, unspecified COPD type  PE (pulmonary thromboembolism)  DVT (deep venous thrombosis)  No significant past surgical history    SOCIAL HISTORY:    ALLERGIES:  penicillin (Hives)    MEDICATIONS:  STANDING MEDICATIONS  amLODIPine   Tablet 10 milliGRAM(s) Oral daily  buDESOnide 160 MICROgram(s)/formoterol 4.5 MICROgram(s) Inhaler 2 Puff(s) Inhalation two times a day  ciprofloxacin     Tablet 250 milliGRAM(s) Oral every 12 hours  fluticasone propionate 50 MICROgram(s)/spray Nasal Spray 1 Spray(s) Both Nostrils two times a day  furosemide    Tablet 20 milliGRAM(s) Oral daily  lisinopril 10 milliGRAM(s) Oral daily  montelukast 10 milliGRAM(s) Oral daily  pantoprazole    Tablet 40 milliGRAM(s) Oral before breakfast  sodium chloride 0.9% lock flush 3 milliLiter(s) IV Push every 8 hours    PRN MEDICATIONS  ALBUTerol    90 MICROgram(s) HFA Inhaler 2 Puff(s) Inhalation every 6 hours PRN  ibuprofen  Tablet 400 milliGRAM(s) Oral every 8 hours PRN  oxyCODONE    5 mG/acetaminophen 325 mG 1 Tablet(s) Oral every 6 hours PRN  phenazopyridine 100 milliGRAM(s) Oral three times a day PRN    VITALS:   T(F): 96.8  HR: 75  BP: 123/66  RR: 20  SpO2: 96%    LABS:                        11.8   5.79  )-----------( 204      ( 08 May 2018 07:01 )             36.9     05-08    145  |  106  |  10  ----------------------------<  113<H>  4.1   |  25  |  0.7    Ca    8.6      08 May 2018 07:01      PT/INR - ( 08 May 2018 07:01 )   PT: 22.20 sec;   INR: 2.03 ratio                   Culture - Blood (collected 06 May 2018 05:25)  Source: .Blood None  Preliminary Report (07 May 2018 10:01):    No growth to date.    Culture - Urine (collected 05 May 2018 21:42)  Source: .Urine Clean Catch (Midstream)  Final Report (06 May 2018 21:57):    No growth          RADIOLOGY:    PHYSICAL EXAM:  GEN: No acute distress  HEENT: NC/AT  LUNGS: Clear to auscultation bilaterally   HEART: S1/S2 present.   ABD: Soft, non-tender, non-distended. Bowel sounds present  EXT: RLE mobility limited 2/2 pain  NEURO: AAOX3

## 2018-05-08 NOTE — PROGRESS NOTE ADULT - SUBJECTIVE AND OBJECTIVE BOX
SUKHJINDER RUIZ  58y  Female      Patient is a 58y old  Female who presents with a chief complaint of low back pain a/w right lower extremity pain x 3 day duration (05 May 2018 21:06)      INTERVAL HPI/OVERNIGHT EVENTS:      ******************************* REVIEW OF SYSTEMS:**********************************************  feels better.  All other review of systems negative    *********************** VITALS ******************************************    T(F): 96.3 (05-08-18 @ 05:58)  HR: 65 (05-08-18 @ 05:58) (65 - 77)  BP: 134/68 (05-08-18 @ 05:58) (121/65 - 152/67)  RR: 18 (05-08-18 @ 05:58) (18 - 18)  SpO2: 96% (05-07-18 @ 20:15) (96% - 96%)    05-07-18 @ 07:01  -  05-08-18 @ 07:00  --------------------------------------------------------  IN: 550 mL / OUT: 0 mL / NET: 550 mL    05-08-18 @ 07:01  -  05-08-18 @ 11:56  --------------------------------------------------------  IN: 210 mL / OUT: 550 mL / NET: -340 mL            05-07-18 @ 07:01  -  05-08-18 @ 07:00  --------------------------------------------------------  IN: 550 mL / OUT: 0 mL / NET: 550 mL    05-08-18 @ 07:01  -  05-08-18 @ 11:56  --------------------------------------------------------  IN: 210 mL / OUT: 550 mL / NET: -340 mL        ******************************** PHYSICAL EXAM:**************************************************  GENERAL: NAD    PSYCH:   HEENT:     NERVOUS SYSTEM:  Alert & Oriented X3,    PULMONARY: GABRIELLE, CTA    CARDIOVASCULAR: S1S2 RRR    GI: Soft, NT, ND; BS present.    EXTREMITIES:  2+ Peripheral Pulses, Improved ROM LE B/L    LYMPH: No lymphadenopathy noted    SKIN: No rashes or lesions    ******************************************************************************************    Consultant(s) Notes Reviewed:  [x ] YES  [ ] NO    Discussed with Consultants/Other Providers [ x] YES     **************************** LABS *******************************************************                          11.8   5.79  )-----------( 204      ( 08 May 2018 07:01 )             36.9               PT/INR - ( 08 May 2018 07:01 )   PT: 22.20 sec;   INR: 2.03 ratio           Lactate Trend        CAPILLARY BLOOD GLUCOSE              **************************Active Medications *******************************************  penicillin (Hives)      ALBUTerol    90 MICROgram(s) HFA Inhaler 2 Puff(s) Inhalation every 6 hours PRN  amLODIPine   Tablet 10 milliGRAM(s) Oral daily  buDESOnide 160 MICROgram(s)/formoterol 4.5 MICROgram(s) Inhaler 2 Puff(s) Inhalation two times a day  ciprofloxacin     Tablet 250 milliGRAM(s) Oral every 12 hours  fluticasone propionate 50 MICROgram(s)/spray Nasal Spray 1 Spray(s) Both Nostrils two times a day  furosemide    Tablet 20 milliGRAM(s) Oral daily  ibuprofen  Tablet 400 milliGRAM(s) Oral every 8 hours PRN  lisinopril 10 milliGRAM(s) Oral daily  montelukast 10 milliGRAM(s) Oral daily  oxyCODONE    5 mG/acetaminophen 325 mG 1 Tablet(s) Oral every 6 hours PRN  pantoprazole    Tablet 40 milliGRAM(s) Oral before breakfast  phenazopyridine 100 milliGRAM(s) Oral three times a day PRN  sodium chloride 0.9% lock flush 3 milliLiter(s) IV Push every 8 hours      ***************************************************  RADIOLOGY & ADDITIONAL TESTS:    Imaging Personally Reviewed:  [ ] YES  [ ] NO    HEALTH ISSUES - PROBLEM Dx:

## 2018-05-08 NOTE — CONSULT NOTE ADULT - SUBJECTIVE AND OBJECTIVE BOX
Chief Complaint:    HPI:  57 y/o female with history of asthma , DVT/PE on coumadin , copd, htn , YOLIE on cpap , presented to ER for c/o low back pain radiating to right leg x 3 days  As per patient back pain was sudden in onset , first noticed 3 days ptp , sharp shooting type, 8/10 in intensity worsened by walking and movement .No h/o trauma or fall .denies urinary or fecal incontinence , fever, chills , diarrhea , constipation .  does c/o dysuria x 3 days   denies any history of heavy weight lifting or recent strenuous activity , no h/o similar complaints in the past  at baseline patient uses a cane for ambulation (05 May 2018 21:06)    on oxycodone 5 mg for pain control  Allergies    penicillin (Hives)    Intolerances        PAST MEDICAL & SURGICAL HISTORY:  Atrial fibrillation, unspecified type  Congestive heart failure, unspecified HF chronicity, unspecified heart failure type  Sleep apnea, unspecified type  Gastroesophageal reflux disease without esophagitis  Asthma, unspecified asthma severity, unspecified whether complicated, unspecified whether persistent  Chronic obstructive pulmonary disease, unspecified COPD type  PE (pulmonary thromboembolism)  DVT (deep venous thrombosis)  No significant past surgical history      SOCIAL HISTORY:  Denies Smoking, Alcohol, or Drug Use    PAIN MEDICATIONS:  ibuprofen  Tablet 400 milliGRAM(s) Oral every 8 hours PRN  oxyCODONE    5 mG/acetaminophen 325 mG 1 Tablet(s) Oral every 6 hours PRN    Heme:    Antibiotics:  ciprofloxacin     Tablet 250 milliGRAM(s) Oral every 12 hours    Cardiovascular:  amLODIPine   Tablet 10 milliGRAM(s) Oral daily  furosemide    Tablet 20 milliGRAM(s) Oral daily  lisinopril 10 milliGRAM(s) Oral daily    GI:  pantoprazole    Tablet 40 milliGRAM(s) Oral before breakfast    Endocrine:    All Other Medications:  fluticasone propionate 50 MICROgram(s)/spray Nasal Spray 1 Spray(s) Both Nostrils two times a day  phenazopyridine 100 milliGRAM(s) Oral three times a day PRN  sodium chloride 0.9% lock flush 3 milliLiter(s) IV Push every 8 hours      Vital Signs Last 24 Hrs  T(C): 35.7 (08 May 2018 05:58), Max: 37 (07 May 2018 13:30)  T(F): 96.3 (08 May 2018 05:58), Max: 98.6 (07 May 2018 13:30)  HR: 65 (08 May 2018 05:58) (65 - 77)  BP: 134/68 (08 May 2018 05:58) (121/65 - 152/67)  BP(mean): --  RR: 18 (08 May 2018 05:58) (18 - 18)  SpO2: 96% (07 May 2018 20:15) (96% - 96%)    PAIN SCORE:         SCALE USED: (1-10 VNRS)      PHYSICAL EXAM:    GENERAL: NAD, well-groomed, well-developed  HEAD:  Atraumatic, Normocephalic  EYES: EOMI, PERRLA, conjunctiva and sclera clear  Decreasd ROM LS spine sensation intact    LABS:                          11.8   5.79  )-----------( 204      ( 08 May 2018 07:01 )             36.9             [x ]  [place]  Reviewed- none on system

## 2018-05-08 NOTE — CONSULT NOTE ADULT - ASSESSMENT
1. Dali is not a candidate for an epidural steroid injection as she is on coumadin and has an INR above 3. If she can be off the coumadin until she has a normal INR and has something like a lovanox bridge which is stopped 24 hours prior to the injection she cannot proceed with it. The injection is not a guarantee to help either. IV or PO steroids are an options but the risks need to be weighed.    2. I would add and titrate neurontin 300 mg PO TID and titrate to 2344-0153 mg daily over several weeks.    3. Consider adding an anti depressant for pain such as amytriptiline 25 mg PO QHS or Cymbalta 30 mg PO QHS if she is not on them already.
59 yo female with LBP and new onset RLE pain, difficulty ambulating, right anterior thigh numbness and a sensation of "bladder numbness"  Pt denies incontinence, bedside ultrasound showed 100cc in bladder, uncertain if it was obtained immediately post void  Physical exam shows symptoms of RLE radiculopathy, causa equina unlikely    plan  -ua, ucx, bladder sonogram pre and post void  -mri lumbar spine to r/o disc herniation  -will follow
IMPRESSION: Rehab of Gait ab LBP/ Lumbar radic    PRECAUTIONS: [ x   ] Cardiac  [    ] Respiratory  [    ] Seizures [    ] Contact Isolation  [    ] Droplet Isolation  [    ] Other    Weight Bearing Status:     RECOMMENDATION:  PAIN MANAGEMENT? EPIDURAL    Out of Bed to Chair     DVT/Decubiti Prophylaxis    REHAB PLAN:     [   x  ] Bedside P/T 3-5 times a week   [     ] Bedside O/T  2-3 times a week   [     ] No Rehab Therapy Indicated   [     ]  Speech Therapy   Conditioning/ROM                                 ADL  Bed Mobility                                            Conditioning/ROM  Transfers                                                  Bed Mobility  Sitting /Standing Balance                      Transfers                                        Gait Training                                            Sitting/Standing Balance  Stair Training [   ]Applicable                 Home equipment Eval                                                                     Splinting  [   ] Only      GOALS:   ADL   [  x  ]   Independent         Transfers  [ x   ] Independent            Ambulation  [  x   ] Independent     [   x  ] With device                            [    ]  CG                                               [    ]  CG                                                    [     ] CG                            [    ] Min A                                          [    ] Min A                                                [     ] Min  A          DISCHARGE PLAN:   [     ]  Good candidate for Intensive Rehabilitation/Hospital based-4A [facility]                                             Will tolerate 3hrs Intensive Rehab Daily                                       [ x     ]  Short Term Rehab in Skilled Nursing Facility                                       [      ]  Home with Outpatient or  services                                         [      ]  Possible Candidate for Intensive Hospital based Rehab
# Cystitis without evidence of pyelonephritis:   -Continue Rocephin. Can de-escalate to oral antibiotics once cultures are finalized.   -PCN allergy so ciprofloxacin will be an appropriate choice.     # Right knee arthritis: No evidence of septic arthritis. Consider Xray if pain persists.    # YOLIE, using Bipap at nights but no continuous O2 / CHF/ COPD/ A. fibrillation / PCN allergy / H/O PE.

## 2018-05-09 LAB
ANION GAP SERPL CALC-SCNC: 14 MMOL/L — SIGNIFICANT CHANGE UP (ref 7–14)
BUN SERPL-MCNC: 11 MG/DL — SIGNIFICANT CHANGE UP (ref 10–20)
CALCIUM SERPL-MCNC: 9.4 MG/DL — SIGNIFICANT CHANGE UP (ref 8.5–10.1)
CHLORIDE SERPL-SCNC: 102 MMOL/L — SIGNIFICANT CHANGE UP (ref 98–110)
CO2 SERPL-SCNC: 28 MMOL/L — SIGNIFICANT CHANGE UP (ref 17–32)
CREAT SERPL-MCNC: 0.8 MG/DL — SIGNIFICANT CHANGE UP (ref 0.7–1.5)
GLUCOSE SERPL-MCNC: 115 MG/DL — HIGH (ref 70–99)
HCT VFR BLD CALC: 39.4 % — SIGNIFICANT CHANGE UP (ref 37–47)
HGB BLD-MCNC: 12.6 G/DL — SIGNIFICANT CHANGE UP (ref 12–16)
INR BLD: 1.78 RATIO — HIGH (ref 0.65–1.3)
MCHC RBC-ENTMCNC: 26.9 PG — LOW (ref 27–31)
MCHC RBC-ENTMCNC: 32 G/DL — SIGNIFICANT CHANGE UP (ref 32–37)
MCV RBC AUTO: 84.2 FL — SIGNIFICANT CHANGE UP (ref 81–99)
NRBC # BLD: 0 /100 WBCS — SIGNIFICANT CHANGE UP (ref 0–0)
PLATELET # BLD AUTO: 224 K/UL — SIGNIFICANT CHANGE UP (ref 130–400)
POTASSIUM SERPL-MCNC: 4.2 MMOL/L — SIGNIFICANT CHANGE UP (ref 3.5–5)
POTASSIUM SERPL-SCNC: 4.2 MMOL/L — SIGNIFICANT CHANGE UP (ref 3.5–5)
PROTHROM AB SERPL-ACNC: 19.5 SEC — HIGH (ref 9.95–12.87)
RBC # BLD: 4.68 M/UL — SIGNIFICANT CHANGE UP (ref 4.2–5.4)
RBC # FLD: 14.6 % — HIGH (ref 11.5–14.5)
SODIUM SERPL-SCNC: 144 MMOL/L — SIGNIFICANT CHANGE UP (ref 135–146)
WBC # BLD: 6.44 K/UL — SIGNIFICANT CHANGE UP (ref 4.8–10.8)
WBC # FLD AUTO: 6.44 K/UL — SIGNIFICANT CHANGE UP (ref 4.8–10.8)

## 2018-05-09 RX ORDER — GABAPENTIN 400 MG/1
300 CAPSULE ORAL EVERY 8 HOURS
Qty: 0 | Refills: 0 | Status: DISCONTINUED | OUTPATIENT
Start: 2018-05-09 | End: 2018-05-10

## 2018-05-09 RX ORDER — WARFARIN SODIUM 2.5 MG/1
5 TABLET ORAL ONCE
Qty: 0 | Refills: 0 | Status: COMPLETED | OUTPATIENT
Start: 2018-05-09 | End: 2018-05-09

## 2018-05-09 RX ADMIN — BUDESONIDE AND FORMOTEROL FUMARATE DIHYDRATE 2 PUFF(S): 160; 4.5 AEROSOL RESPIRATORY (INHALATION) at 21:09

## 2018-05-09 RX ADMIN — Medication 250 MILLIGRAM(S): at 18:35

## 2018-05-09 RX ADMIN — WARFARIN SODIUM 5 MILLIGRAM(S): 2.5 TABLET ORAL at 21:09

## 2018-05-09 RX ADMIN — PANTOPRAZOLE SODIUM 40 MILLIGRAM(S): 20 TABLET, DELAYED RELEASE ORAL at 06:09

## 2018-05-09 RX ADMIN — Medication 250 MILLIGRAM(S): at 05:45

## 2018-05-09 RX ADMIN — LISINOPRIL 10 MILLIGRAM(S): 2.5 TABLET ORAL at 05:45

## 2018-05-09 RX ADMIN — BUDESONIDE AND FORMOTEROL FUMARATE DIHYDRATE 2 PUFF(S): 160; 4.5 AEROSOL RESPIRATORY (INHALATION) at 08:51

## 2018-05-09 RX ADMIN — MONTELUKAST 10 MILLIGRAM(S): 4 TABLET, CHEWABLE ORAL at 11:26

## 2018-05-09 RX ADMIN — GABAPENTIN 300 MILLIGRAM(S): 400 CAPSULE ORAL at 21:09

## 2018-05-09 RX ADMIN — Medication 1 SPRAY(S): at 06:09

## 2018-05-09 RX ADMIN — SODIUM CHLORIDE 3 MILLILITER(S): 9 INJECTION INTRAMUSCULAR; INTRAVENOUS; SUBCUTANEOUS at 13:28

## 2018-05-09 RX ADMIN — OXYCODONE AND ACETAMINOPHEN 1 TABLET(S): 5; 325 TABLET ORAL at 06:12

## 2018-05-09 RX ADMIN — OXYCODONE AND ACETAMINOPHEN 1 TABLET(S): 5; 325 TABLET ORAL at 05:42

## 2018-05-09 RX ADMIN — Medication 20 MILLIGRAM(S): at 05:45

## 2018-05-09 RX ADMIN — GABAPENTIN 300 MILLIGRAM(S): 400 CAPSULE ORAL at 14:42

## 2018-05-09 RX ADMIN — Medication 1 SPRAY(S): at 18:35

## 2018-05-09 NOTE — PROGRESS NOTE ADULT - SUBJECTIVE AND OBJECTIVE BOX
SUKHJINDER RUIZ  58y  Female      Patient is a 58y old  Female who presents with a chief complaint of low back pain a/w right lower extremity pain x 3 day duration (05 May 2018 21:06)      INTERVAL HPI/OVERNIGHT EVENTS: patient reports her pain is only mildly better than prior. she did not yet receive gabapentin      REVIEW OF SYSTEMS:  as above  All other review of systems negative    T(C): 36.2 (05-09-18 @ 13:29), Max: 37.1 (05-09-18 @ 05:30)  HR: 78 (05-09-18 @ 13:29) (73 - 80)  BP: 108/56 (05-09-18 @ 13:29) (108/56 - 136/60)  RR: 16 (05-09-18 @ 13:29) (16 - 20)  SpO2: 97% (05-08-18 @ 19:45) (97% - 97%)  Wt(kg): --Vital Signs Last 24 Hrs  T(C): 36.2 (09 May 2018 13:29), Max: 37.1 (09 May 2018 05:30)  T(F): 97.2 (09 May 2018 13:29), Max: 98.7 (09 May 2018 05:30)  HR: 78 (09 May 2018 13:29) (73 - 80)  BP: 108/56 (09 May 2018 13:29) (108/56 - 136/60)  BP(mean): --  RR: 16 (09 May 2018 13:29) (16 - 20)  SpO2: 97% (08 May 2018 19:45) (97% - 97%)      05-08-18 @ 07:01  -  05-09-18 @ 07:00  --------------------------------------------------------  IN: 440 mL / OUT: 550 mL / NET: -110 mL        PHYSICAL EXAM:  GENERAL: NAD  PSYCH: no agitation, baseline mentation  NERVOUS SYSTEM:  Alert & Oriented X3, no new focal deficits  PULMONARY: Clear to percussion bilaterally; No rales, rhonchi, wheezing, or rubs  CARDIOVASCULAR: Regular rate and rhythm; No murmurs, rubs, or gallops  GI: Soft, Nontender, Nondistended; Bowel sounds present, obese  EXTREMITIES:  2+ Peripheral Pulses, No clubbing, cyanosis, or edema, R knee ttp, +straight leg test on R    Consultant(s) Notes Reviewed:  [x ] YES  [ ] NO    Discussed with Consultants/Other Providers [ x] YES     LABS                          12.6   6.44  )-----------( 224      ( 09 May 2018 06:08 )             39.4     05-09    144  |  102  |  11  ----------------------------<  115<H>  4.2   |  28  |  0.8    Ca    9.4      09 May 2018 06:08          PT/INR - ( 09 May 2018 06:08 )   PT: 19.50 sec;   INR: 1.78 ratio           Lactate Trend        CAPILLARY BLOOD GLUCOSE            RADIOLOGY & ADDITIONAL TESTS:    Imaging Personally Reviewed:  [ ] YES  [ ] NO    HEALTH ISSUES - PROBLEM Dx:

## 2018-05-09 NOTE — PROGRESS NOTE ADULT - ASSESSMENT
59 y/o female with history of copd , dvt/pe  on coumadin ,htn , blaine on cpap ,dm-2 Presented with c/o LBP radiating to RLE x 3 days     1)Functional decline 2/2 LBP radiating to RLE -    2/2 Spinal stenosis.   supportive care  analgesia  gabapentin for neuralgia/ uptitrate      2) cystitis  abx  Pyridium.    3)DVT/PE on coumadin  INR therapeutic -  c/w Coumadin.    4)asthma / copd - possible  ACOS ? - stable   c/w duonebs , symbicort     5)BLAINE on cpap qhs    6)dm-2 - monitor FS and initiate insulin sliding scale if FS >140  7)dvt ppx- patient on coumadin for dvt treatment   8)full code from home      Functional decline 2/2 LBP  PT REHAB/ CM f/u for help with disposition planning, c/w PT for now

## 2018-05-09 NOTE — PROGRESS NOTE ADULT - ASSESSMENT
58F with pmh copd, dvt/pe on coumadin, htn, blaine on cpap, DMII presented with c/o LBP radiating to RLE x3 days.    1)LBP radiating to RLE - physical exam significant for + SLR RLE; no fecal or urinary incontinence, no saddle anesthesia, likely 2/2 spinal stenosis seen on MRI  -neurosurgery following, MRI results reviewed  -pain c/s recommended gabapentin for pain control, tolerated first dose  -PT/Rehab saw patient, recommended STR, however, patient did not meet criteria at [facility], will try to find alternative in AM with case management    2)dysuria with +ve UA and lower abdominal pain, suprapubic tenderness  -remains on po cipro per ID for cystitis  -ucx and blood cx - negative to date  -US abdomen reviewed    3)Chronic DVT/PE on coumadin  -INR reviewed this AM, will continue coumadin tonight    4)asthma/copd, stable  -c/w duonebs, symbicort     5)BLAINE   -c/w cpap     6)DMII  -monitor FS for now    DVT PPX coumadin  Full code

## 2018-05-09 NOTE — PROGRESS NOTE ADULT - SUBJECTIVE AND OBJECTIVE BOX
SUBJECTIVE:    Patient is a 58y old  Female who presents with a chief complaint of low back pain a/w right lower extremity pain x 3 day duration (05 May 2018 21:06)    Currently admitted to medicine with the primary diagnosis of Back pain     Today is hospital day 4d. This morning she is resting comfortably in bed and reports no new issues or overnight events. Her pain is improved, but still present. She is concerned about side effects from neurontin, but is willing to try it.     PAST MEDICAL & SURGICAL HISTORY  PAST MEDICAL & SURGICAL HISTORY:  Atrial fibrillation, unspecified type  Congestive heart failure, unspecified HF chronicity, unspecified heart failure type  Sleep apnea, unspecified type  Gastroesophageal reflux disease without esophagitis  Asthma, unspecified asthma severity, unspecified whether complicated, unspecified whether persistent  Chronic obstructive pulmonary disease, unspecified COPD type  PE (pulmonary thromboembolism)  DVT (deep venous thrombosis)  No significant past surgical history    SOCIAL HISTORY:    ALLERGIES:  penicillin (Hives)    MEDICATIONS:  STANDING MEDICATIONS  amLODIPine   Tablet 10 milliGRAM(s) Oral daily  buDESOnide 160 MICROgram(s)/formoterol 4.5 MICROgram(s) Inhaler 2 Puff(s) Inhalation two times a day  ciprofloxacin     Tablet 250 milliGRAM(s) Oral every 12 hours  fluticasone propionate 50 MICROgram(s)/spray Nasal Spray 1 Spray(s) Both Nostrils two times a day  furosemide    Tablet 20 milliGRAM(s) Oral daily  gabapentin 300 milliGRAM(s) Oral every 8 hours  lisinopril 10 milliGRAM(s) Oral daily  montelukast 10 milliGRAM(s) Oral daily  pantoprazole    Tablet 40 milliGRAM(s) Oral before breakfast  sodium chloride 0.9% lock flush 3 milliLiter(s) IV Push every 8 hours    PRN MEDICATIONS  ALBUTerol    90 MICROgram(s) HFA Inhaler 2 Puff(s) Inhalation every 6 hours PRN  ibuprofen  Tablet 400 milliGRAM(s) Oral every 8 hours PRN  oxyCODONE    5 mG/acetaminophen 325 mG 1 Tablet(s) Oral every 6 hours PRN  phenazopyridine 100 milliGRAM(s) Oral three times a day PRN    VITALS:   T(F): 97.2  HR: 78  BP: 108/56  RR: 16  SpO2: 97%    LABS:                        12.6   6.44  )-----------( 224      ( 09 May 2018 06:08 )             39.4     05-09    144  |  102  |  11  ----------------------------<  115<H>  4.2   |  28  |  0.8    Ca    9.4      09 May 2018 06:08      PT/INR - ( 09 May 2018 06:08 )   PT: 19.50 sec;   INR: 1.78 ratio           RADIOLOGY:    PHYSICAL EXAM:  GEN: No acute distress  HEENT: NC/AT  LUNGS: Clear to auscultation bilaterally   HEART: S1/S2 present.   ABD: Soft, non-tender, non-distended. Bowel sounds present  EXT: RLE significant pain with movement, no edema  NEURO: AAOX3, some parasthesia of RLE, stable

## 2018-05-10 LAB
ANION GAP SERPL CALC-SCNC: 13 MMOL/L — SIGNIFICANT CHANGE UP (ref 7–14)
BUN SERPL-MCNC: 13 MG/DL — SIGNIFICANT CHANGE UP (ref 10–20)
CALCIUM SERPL-MCNC: 9.2 MG/DL — SIGNIFICANT CHANGE UP (ref 8.5–10.1)
CHLORIDE SERPL-SCNC: 103 MMOL/L — SIGNIFICANT CHANGE UP (ref 98–110)
CO2 SERPL-SCNC: 27 MMOL/L — SIGNIFICANT CHANGE UP (ref 17–32)
CREAT SERPL-MCNC: 0.9 MG/DL — SIGNIFICANT CHANGE UP (ref 0.7–1.5)
GLUCOSE SERPL-MCNC: 113 MG/DL — HIGH (ref 70–99)
HCT VFR BLD CALC: 38.5 % — SIGNIFICANT CHANGE UP (ref 37–47)
HGB BLD-MCNC: 12.3 G/DL — SIGNIFICANT CHANGE UP (ref 12–16)
INR BLD: 1.69 RATIO — HIGH (ref 0.65–1.3)
MCHC RBC-ENTMCNC: 26.9 PG — LOW (ref 27–31)
MCHC RBC-ENTMCNC: 31.9 G/DL — LOW (ref 32–37)
MCV RBC AUTO: 84.1 FL — SIGNIFICANT CHANGE UP (ref 81–99)
NRBC # BLD: 0 /100 WBCS — SIGNIFICANT CHANGE UP (ref 0–0)
PLATELET # BLD AUTO: 228 K/UL — SIGNIFICANT CHANGE UP (ref 130–400)
POTASSIUM SERPL-MCNC: 4.4 MMOL/L — SIGNIFICANT CHANGE UP (ref 3.5–5)
POTASSIUM SERPL-SCNC: 4.4 MMOL/L — SIGNIFICANT CHANGE UP (ref 3.5–5)
PROTHROM AB SERPL-ACNC: 18.5 SEC — HIGH (ref 9.95–12.87)
RBC # BLD: 4.58 M/UL — SIGNIFICANT CHANGE UP (ref 4.2–5.4)
RBC # FLD: 14.6 % — HIGH (ref 11.5–14.5)
SODIUM SERPL-SCNC: 143 MMOL/L — SIGNIFICANT CHANGE UP (ref 135–146)
WBC # BLD: 5.71 K/UL — SIGNIFICANT CHANGE UP (ref 4.8–10.8)
WBC # FLD AUTO: 5.71 K/UL — SIGNIFICANT CHANGE UP (ref 4.8–10.8)

## 2018-05-10 RX ORDER — GABAPENTIN 400 MG/1
400 CAPSULE ORAL THREE TIMES A DAY
Qty: 0 | Refills: 0 | Status: DISCONTINUED | OUTPATIENT
Start: 2018-05-10 | End: 2018-05-11

## 2018-05-10 RX ORDER — WARFARIN SODIUM 2.5 MG/1
7.5 TABLET ORAL ONCE
Qty: 0 | Refills: 0 | Status: COMPLETED | OUTPATIENT
Start: 2018-05-10 | End: 2018-05-10

## 2018-05-10 RX ADMIN — OXYCODONE AND ACETAMINOPHEN 1 TABLET(S): 5; 325 TABLET ORAL at 09:41

## 2018-05-10 RX ADMIN — OXYCODONE AND ACETAMINOPHEN 1 TABLET(S): 5; 325 TABLET ORAL at 04:15

## 2018-05-10 RX ADMIN — OXYCODONE AND ACETAMINOPHEN 1 TABLET(S): 5; 325 TABLET ORAL at 11:23

## 2018-05-10 RX ADMIN — SODIUM CHLORIDE 3 MILLILITER(S): 9 INJECTION INTRAMUSCULAR; INTRAVENOUS; SUBCUTANEOUS at 14:00

## 2018-05-10 RX ADMIN — Medication 20 MILLIGRAM(S): at 05:39

## 2018-05-10 RX ADMIN — MONTELUKAST 10 MILLIGRAM(S): 4 TABLET, CHEWABLE ORAL at 12:32

## 2018-05-10 RX ADMIN — PANTOPRAZOLE SODIUM 40 MILLIGRAM(S): 20 TABLET, DELAYED RELEASE ORAL at 06:03

## 2018-05-10 RX ADMIN — BUDESONIDE AND FORMOTEROL FUMARATE DIHYDRATE 2 PUFF(S): 160; 4.5 AEROSOL RESPIRATORY (INHALATION) at 07:49

## 2018-05-10 RX ADMIN — Medication 250 MILLIGRAM(S): at 05:39

## 2018-05-10 RX ADMIN — LISINOPRIL 10 MILLIGRAM(S): 2.5 TABLET ORAL at 05:38

## 2018-05-10 RX ADMIN — WARFARIN SODIUM 7.5 MILLIGRAM(S): 2.5 TABLET ORAL at 21:20

## 2018-05-10 RX ADMIN — Medication 250 MILLIGRAM(S): at 17:33

## 2018-05-10 RX ADMIN — SODIUM CHLORIDE 3 MILLILITER(S): 9 INJECTION INTRAMUSCULAR; INTRAVENOUS; SUBCUTANEOUS at 21:18

## 2018-05-10 RX ADMIN — GABAPENTIN 300 MILLIGRAM(S): 400 CAPSULE ORAL at 05:39

## 2018-05-10 RX ADMIN — Medication 1 SPRAY(S): at 05:39

## 2018-05-10 RX ADMIN — Medication 1 SPRAY(S): at 17:33

## 2018-05-10 RX ADMIN — OXYCODONE AND ACETAMINOPHEN 1 TABLET(S): 5; 325 TABLET ORAL at 03:42

## 2018-05-10 RX ADMIN — BUDESONIDE AND FORMOTEROL FUMARATE DIHYDRATE 2 PUFF(S): 160; 4.5 AEROSOL RESPIRATORY (INHALATION) at 21:19

## 2018-05-10 RX ADMIN — GABAPENTIN 400 MILLIGRAM(S): 400 CAPSULE ORAL at 21:20

## 2018-05-10 RX ADMIN — GABAPENTIN 300 MILLIGRAM(S): 400 CAPSULE ORAL at 12:33

## 2018-05-10 NOTE — PROGRESS NOTE ADULT - ASSESSMENT
# Cystitis without evidence of pyelonephritis:   - Culture negative  - Complete ciprofloxacin course.     # Right knee arthritis: Better today.      # YOLIE, using Bipap at nights but no continuous O2 / CHF/ COPD/ A. fibrillation / PCN allergy / H/O PE.

## 2018-05-10 NOTE — PROGRESS NOTE ADULT - ASSESSMENT
57 y/o female with history of copd , dvt/pe  on coumadin ,htn , blaine on cpap ,dm-2 Presented with c/o LBP radiating to RLE x 3 days     1)Functional decline 2/2 LBP radiating to RLE -    2/2 Spinal stenosis.   supportive care  analgesia  gabapentin for neuralgia/ uptitrate more today      2) cystitis  abx  Pyridium.    3)DVT/PE on coumadin  c/w Coumadin; trend inr    4)asthma / copd - possible  ACOS ? - stable   c/w duonebs , symbicort     5)BLAINE on cpap qhs    6)dm-2 - monitor FS and initiate insulin sliding scale if FS >140  7)dvt ppx- patient on coumadin for dvt treatment   8)full code from home      Functional decline 2/2 LBP  anticipate for d/c home with ambulate services and outpatient PT for tomorrow; use rolling walker  denied coverage for SNF for gait rehab

## 2018-05-10 NOTE — PROGRESS NOTE ADULT - ASSESSMENT
58F with pmh copd, dvt/pe on coumadin, htn, blaine on cpap, DMII presented with c/o LBP radiating to RLE x3 days.    1)LBP radiating to RLE - physical exam significant for + SLR RLE; no fecal or urinary incontinence, no saddle anesthesia, likely 2/2 spinal stenosis seen on MRI  -neurosurgery following, MRI results reviewed  -pain c/s recommended gabapentin for pain control, titrated dose to 400mg TID today  -PT/Rehab saw patient, recommended STR, however, patient did not meet criteria at [facility], will try to find alternative in AM with case management    2)dysuria with +ve UA and lower abdominal pain, suprapubic tenderness  -remains on po cipro per ID for cystitis  -ucx and blood cx - negative to date  -US abdomen reviewed    3)Chronic DVT/PE on coumadin  -INR reviewed this AM, will continue coumadin tonight    4)asthma/copd, stable  -c/w duonebs, symbicort     5)BLAINE   -c/w cpap     6)DMII  -monitor FS for now    DVT PPX coumadin  Full code 58F with pmh copd, dvt/pe on coumadin, htn, blaine on cpap, DMII presented with c/o LBP radiating to RLE x3 days.    1)LBP radiating to RLE - physical exam significant for + SLR RLE; no fecal or urinary incontinence, no saddle anesthesia, likely 2/2 spinal stenosis seen on MRI  -neurosurgery following, MRI results reviewed  -pain c/s recommended gabapentin for pain control, titrated dose to 400mg TID today  -PT/Rehab saw patient, recommended STR, however, patient did not meet criteria at [facility], will try to find alternative in AM with case management  -Patient with significant gait dysfunction, pain with ambulation. She will require rolling walker for safe mobility at home.     2)dysuria with +ve UA and lower abdominal pain, suprapubic tenderness  -remains on po cipro per ID for cystitis  -ucx and blood cx - negative to date  -US abdomen reviewed    3)Chronic DVT/PE on coumadin  -INR reviewed this AM, will continue coumadin tonight    4)asthma/copd, stable  -c/w duonebs, symbicort     5)BLAINE   -c/w cpap     6)DMII  -monitor FS for now    DVT PPX coumadin  Full code

## 2018-05-10 NOTE — PROGRESS NOTE ADULT - SUBJECTIVE AND OBJECTIVE BOX
SUBJECTIVE:    Patient is a 58y old  Female who presents with a chief complaint of low back pain a/w right lower extremity pain x 3 day duration (05 May 2018 21:06)    Currently admitted to medicine with the primary diagnosis of Back pain     Today is hospital day 5d. This morning she is resting comfortably in bed and reports no new issues or overnight events. Her pain is improved, but still present.     PAST MEDICAL & SURGICAL HISTORY  PAST MEDICAL & SURGICAL HISTORY:  Atrial fibrillation, unspecified type  Congestive heart failure, unspecified HF chronicity, unspecified heart failure type  Sleep apnea, unspecified type  Gastroesophageal reflux disease without esophagitis  Asthma, unspecified asthma severity, unspecified whether complicated, unspecified whether persistent  Chronic obstructive pulmonary disease, unspecified COPD type  PE (pulmonary thromboembolism)  DVT (deep venous thrombosis)  No significant past surgical history    SOCIAL HISTORY:    ALLERGIES:  penicillin (Hives)    MEDICATIONS:  STANDING MEDICATIONS  amLODIPine   Tablet 10 milliGRAM(s) Oral daily  buDESOnide 160 MICROgram(s)/formoterol 4.5 MICROgram(s) Inhaler 2 Puff(s) Inhalation two times a day  ciprofloxacin     Tablet 250 milliGRAM(s) Oral every 12 hours  fluticasone propionate 50 MICROgram(s)/spray Nasal Spray 1 Spray(s) Both Nostrils two times a day  furosemide    Tablet 20 milliGRAM(s) Oral daily  gabapentin 400 milliGRAM(s) Oral three times a day  lisinopril 10 milliGRAM(s) Oral daily  montelukast 10 milliGRAM(s) Oral daily  pantoprazole    Tablet 40 milliGRAM(s) Oral before breakfast  sodium chloride 0.9% lock flush 3 milliLiter(s) IV Push every 8 hours    PRN MEDICATIONS  ALBUTerol    90 MICROgram(s) HFA Inhaler 2 Puff(s) Inhalation every 6 hours PRN  ibuprofen  Tablet 400 milliGRAM(s) Oral every 8 hours PRN  oxyCODONE    5 mG/acetaminophen 325 mG 1 Tablet(s) Oral every 6 hours PRN  phenazopyridine 100 milliGRAM(s) Oral three times a day PRN    VITALS:   T(F): 97.5  HR: 83  BP: 137/63  RR: 18  SpO2: 94%    LABS:                        12.3   5.71  )-----------( 228      ( 10 May 2018 07:04 )             38.5     05-10    143  |  103  |  13  ----------------------------<  113<H>  4.4   |  27  |  0.9    Ca    9.2      10 May 2018 07:04      PT/INR - ( 10 May 2018 07:04 )   PT: 18.50 sec;   INR: 1.69 ratio           RADIOLOGY:    PHYSICAL EXAM:  GEN: No acute distress  HEENT: NC/AT  LUNGS: Clear to auscultation bilaterally   HEART: S1/S2 present.   ABD: Soft, non-tender, non-distended. Bowel sounds present  EXT: RLE pain with mobility  NEURO: AAOX3, nonfocal

## 2018-05-10 NOTE — PROGRESS NOTE ADULT - SUBJECTIVE AND OBJECTIVE BOX
INTERVAL HPI/OVERNIGHT EVENTS:  c/o knee pain and difficulty in ambulation.   Allergies and Intolerances: penicillin:  Hives    Alert, oriented, OOB to chair, clear, Right knee-less pain than yesterday, abd soft, non tender.   No dysuria today.     Vital Signs Last 24 Hrs  T(C): 36.4 (10 May 2018 06:18), Max: 36.4 (09 May 2018 20:22)  T(F): 97.5 (10 May 2018 06:18), Max: 97.5 (09 May 2018 20:22)  HR: 78 (10 May 2018 06:18) (65 - 78)  BP: 115/58 (10 May 2018 06:18) (100/52 - 120/60)  BP(mean): --  RR: 18 (10 May 2018 06:18) (16 - 18)  SpO2: 94% (09 May 2018 21:00) (94% - 94%)    ALBUTerol    90 MICROgram(s) HFA Inhaler 2 Puff(s) Inhalation every 6 hours PRN  amLODIPine   Tablet 10 milliGRAM(s) Oral daily  buDESOnide 160 MICROgram(s)/formoterol 4.5 MICROgram(s) Inhaler 2 Puff(s) Inhalation two times a day  ciprofloxacin     Tablet 250 milliGRAM(s) Oral every 12 hours  fluticasone propionate 50 MICROgram(s)/spray Nasal Spray 1 Spray(s) Both Nostrils two times a day  furosemide    Tablet 20 milliGRAM(s) Oral daily  gabapentin 300 milliGRAM(s) Oral every 8 hours  ibuprofen  Tablet 400 milliGRAM(s) Oral every 8 hours PRN  lisinopril 10 milliGRAM(s) Oral daily  montelukast 10 milliGRAM(s) Oral daily  oxyCODONE    5 mG/acetaminophen 325 mG 1 Tablet(s) Oral every 6 hours PRN  pantoprazole    Tablet 40 milliGRAM(s) Oral before breakfast  phenazopyridine 100 milliGRAM(s) Oral three times a day PRN  sodium chloride 0.9% lock flush 3 milliLiter(s) IV Push every 8 hours  LABS:                        12.3   5.71  )-----------( 228      ( 10 May 2018 07:04 )             38.5     05-10    143  |  103  |  13  ----------------------------<  113<H>  4.4   |  27  |  0.9    Urinalysis Basic - ( 05 May 2018 21:40 )  Color: Yellow / Appearance: Clear / S.015 / pH: x  Gluc: x / Ketone: Negative  / Bili: Negative / Urobili: 0.2 mg/dL   Blood: x / Protein: Negative mg/dL / Nitrite: Negative   Leuk Esterase: Negative / RBC: 5-10 /HPF / WBC x   Sq Epi: x / Non Sq Epi: Occasional /HPF / Bacteria: Few /HPF    Culture - Blood (collected 06 May 2018 05:25)  Source: .Blood None  Preliminary Report (07 May 2018 10:01):    No growth to date.    Culture - Urine (collected 05 May 2018 21:42)  Source: .Urine Clean Catch (Midstream)  Final Report (06 May 2018 21:57):    No growth    MR SPINE LUMBAR:  2018   1.  Lumbar spine degenerative changes notable for:  2.  L3-4 and L4-5 mild spinal stenosis.  3.  L5-S1 moderate/severe bilateral foraminal stenosis. Additional   moderate neural foraminal stenosis on the left at L3-4and bilaterally at   L4-5.  Multilevel facet arthropathy.

## 2018-05-10 NOTE — PROGRESS NOTE ADULT - SUBJECTIVE AND OBJECTIVE BOX
SUKHJINDER RUIZ  58y  Female      Patient is a 58y old  Female who presents with a chief complaint of low back pain a/w right lower extremity pain x 3 day duration (05 May 2018 21:06)      INTERVAL HPI/OVERNIGHT EVENTS: pain marginally improving but still has shooting/tingling sensations down RLE exacerbated by straight raise, and radiating to the lower back      REVIEW OF SYSTEMS:  as above  All other review of systems negative    T(C): 36.4 (05-10-18 @ 14:01), Max: 36.4 (05-09-18 @ 20:22)  HR: 83 (05-10-18 @ 14:01) (65 - 83)  BP: 137/63 (05-10-18 @ 14:01) (100/52 - 137/63)  RR: 18 (05-10-18 @ 14:01) (16 - 18)  SpO2: 94% (05-09-18 @ 21:00) (94% - 94%)  Wt(kg): --Vital Signs Last 24 Hrs  T(C): 36.4 (10 May 2018 14:01), Max: 36.4 (09 May 2018 20:22)  T(F): 97.5 (10 May 2018 14:01), Max: 97.5 (09 May 2018 20:22)  HR: 83 (10 May 2018 14:01) (65 - 83)  BP: 137/63 (10 May 2018 14:01) (100/52 - 137/63)  BP(mean): --  RR: 18 (10 May 2018 14:01) (16 - 18)  SpO2: 94% (09 May 2018 21:00) (94% - 94%)        PHYSICAL EXAM:  GENERAL: NAD  PSYCH: no agitation, baseline mentation  NERVOUS SYSTEM:  Alert & Oriented X3, no new focal deficits  PULMONARY: Clear to percussion bilaterally; No rales, rhonchi, wheezing, or rubs  CARDIOVASCULAR: Regular rate and rhythm; No murmurs, rubs, or gallops  GI: Soft, Nontender, Nondistended; Bowel sounds present, rotund  EXTREMITIES:  +straight leg raise on R    Consultant(s) Notes Reviewed:  [x ] YES  [ ] NO    Discussed with Consultants/Other Providers [ x] YES     LABS                          12.3   5.71  )-----------( 228      ( 10 May 2018 07:04 )             38.5     05-10    143  |  103  |  13  ----------------------------<  113<H>  4.4   |  27  |  0.9    Ca    9.2      10 May 2018 07:04          PT/INR - ( 10 May 2018 07:04 )   PT: 18.50 sec;   INR: 1.69 ratio           Lactate Trend        CAPILLARY BLOOD GLUCOSE  138 (10 May 2018 10:35)            RADIOLOGY & ADDITIONAL TESTS:    Imaging Personally Reviewed:  [ ] YES  [ x] NO    HEALTH ISSUES - PROBLEM Dx:

## 2018-05-11 ENCOUNTER — TRANSCRIPTION ENCOUNTER (OUTPATIENT)
Age: 58
End: 2018-05-11

## 2018-05-11 VITALS — WEIGHT: 286.82 LBS

## 2018-05-11 LAB
ANION GAP SERPL CALC-SCNC: 14 MMOL/L — SIGNIFICANT CHANGE UP (ref 7–14)
BUN SERPL-MCNC: 13 MG/DL — SIGNIFICANT CHANGE UP (ref 10–20)
CALCIUM SERPL-MCNC: 9.4 MG/DL — SIGNIFICANT CHANGE UP (ref 8.5–10.1)
CHLORIDE SERPL-SCNC: 99 MMOL/L — SIGNIFICANT CHANGE UP (ref 98–110)
CO2 SERPL-SCNC: 29 MMOL/L — SIGNIFICANT CHANGE UP (ref 17–32)
CREAT SERPL-MCNC: 0.8 MG/DL — SIGNIFICANT CHANGE UP (ref 0.7–1.5)
CULTURE RESULTS: SIGNIFICANT CHANGE UP
GLUCOSE SERPL-MCNC: 108 MG/DL — HIGH (ref 70–99)
HCT VFR BLD CALC: 39.4 % — SIGNIFICANT CHANGE UP (ref 37–47)
HGB BLD-MCNC: 12.5 G/DL — SIGNIFICANT CHANGE UP (ref 12–16)
INR BLD: 1.69 RATIO — HIGH (ref 0.65–1.3)
MCHC RBC-ENTMCNC: 26.8 PG — LOW (ref 27–31)
MCHC RBC-ENTMCNC: 31.7 G/DL — LOW (ref 32–37)
MCV RBC AUTO: 84.4 FL — SIGNIFICANT CHANGE UP (ref 81–99)
NRBC # BLD: 0 /100 WBCS — SIGNIFICANT CHANGE UP (ref 0–0)
PLATELET # BLD AUTO: 237 K/UL — SIGNIFICANT CHANGE UP (ref 130–400)
POTASSIUM SERPL-MCNC: 4.4 MMOL/L — SIGNIFICANT CHANGE UP (ref 3.5–5)
POTASSIUM SERPL-SCNC: 4.4 MMOL/L — SIGNIFICANT CHANGE UP (ref 3.5–5)
PROTHROM AB SERPL-ACNC: 18.4 SEC — HIGH (ref 9.95–12.87)
RBC # BLD: 4.67 M/UL — SIGNIFICANT CHANGE UP (ref 4.2–5.4)
RBC # FLD: 14.6 % — HIGH (ref 11.5–14.5)
SODIUM SERPL-SCNC: 142 MMOL/L — SIGNIFICANT CHANGE UP (ref 135–146)
SPECIMEN SOURCE: SIGNIFICANT CHANGE UP
WBC # BLD: 5.45 K/UL — SIGNIFICANT CHANGE UP (ref 4.8–10.8)
WBC # FLD AUTO: 5.45 K/UL — SIGNIFICANT CHANGE UP (ref 4.8–10.8)

## 2018-05-11 RX ORDER — WARFARIN SODIUM 2.5 MG/1
1 TABLET ORAL
Qty: 90 | Refills: 0 | COMMUNITY

## 2018-05-11 RX ORDER — HYDROCORTISONE 1 %
1 OINTMENT (GRAM) TOPICAL
Qty: 0 | Refills: 0 | Status: DISCONTINUED | OUTPATIENT
Start: 2018-05-11 | End: 2018-05-11

## 2018-05-11 RX ORDER — RAMIPRIL 5 MG
1 CAPSULE ORAL
Qty: 90 | Refills: 0 | COMMUNITY

## 2018-05-11 RX ORDER — WARFARIN SODIUM 2.5 MG/1
0 TABLET ORAL
Qty: 90 | Refills: 0 | COMMUNITY

## 2018-05-11 RX ORDER — GABAPENTIN 400 MG/1
1 CAPSULE ORAL
Qty: 90 | Refills: 0 | OUTPATIENT
Start: 2018-05-11 | End: 2018-06-09

## 2018-05-11 RX ORDER — RAMIPRIL 5 MG
0 CAPSULE ORAL
Qty: 90 | Refills: 0 | COMMUNITY

## 2018-05-11 RX ORDER — CIPROFLOXACIN LACTATE 400MG/40ML
1 VIAL (ML) INTRAVENOUS
Qty: 0 | Refills: 0 | COMMUNITY
Start: 2018-05-11

## 2018-05-11 RX ORDER — GABAPENTIN 400 MG/1
1 CAPSULE ORAL
Qty: 0 | Refills: 0 | COMMUNITY
Start: 2018-05-11

## 2018-05-11 RX ORDER — CIPROFLOXACIN LACTATE 400MG/40ML
1 VIAL (ML) INTRAVENOUS
Qty: 10 | Refills: 0 | OUTPATIENT
Start: 2018-05-11 | End: 2018-05-15

## 2018-05-11 RX ORDER — AMLODIPINE BESYLATE 2.5 MG/1
0 TABLET ORAL
Qty: 90 | Refills: 0 | COMMUNITY

## 2018-05-11 RX ORDER — HYDROCORTISONE 1 %
1 OINTMENT (GRAM) TOPICAL
Qty: 30 | Refills: 0 | OUTPATIENT
Start: 2018-05-11 | End: 2018-05-24

## 2018-05-11 RX ADMIN — Medication 250 MILLIGRAM(S): at 17:31

## 2018-05-11 RX ADMIN — BUDESONIDE AND FORMOTEROL FUMARATE DIHYDRATE 2 PUFF(S): 160; 4.5 AEROSOL RESPIRATORY (INHALATION) at 08:08

## 2018-05-11 RX ADMIN — Medication 1 APPLICATION(S): at 07:06

## 2018-05-11 RX ADMIN — GABAPENTIN 400 MILLIGRAM(S): 400 CAPSULE ORAL at 05:17

## 2018-05-11 RX ADMIN — Medication 250 MILLIGRAM(S): at 05:18

## 2018-05-11 RX ADMIN — PANTOPRAZOLE SODIUM 40 MILLIGRAM(S): 20 TABLET, DELAYED RELEASE ORAL at 06:10

## 2018-05-11 RX ADMIN — Medication 20 MILLIGRAM(S): at 05:18

## 2018-05-11 RX ADMIN — Medication 1 SPRAY(S): at 05:17

## 2018-05-11 RX ADMIN — Medication 1 APPLICATION(S): at 17:31

## 2018-05-11 RX ADMIN — SODIUM CHLORIDE 3 MILLILITER(S): 9 INJECTION INTRAMUSCULAR; INTRAVENOUS; SUBCUTANEOUS at 05:05

## 2018-05-11 RX ADMIN — OXYCODONE AND ACETAMINOPHEN 1 TABLET(S): 5; 325 TABLET ORAL at 08:09

## 2018-05-11 RX ADMIN — Medication 1 SPRAY(S): at 17:31

## 2018-05-11 RX ADMIN — GABAPENTIN 400 MILLIGRAM(S): 400 CAPSULE ORAL at 13:15

## 2018-05-11 RX ADMIN — OXYCODONE AND ACETAMINOPHEN 1 TABLET(S): 5; 325 TABLET ORAL at 10:33

## 2018-05-11 RX ADMIN — AMLODIPINE BESYLATE 10 MILLIGRAM(S): 2.5 TABLET ORAL at 05:17

## 2018-05-11 RX ADMIN — LISINOPRIL 10 MILLIGRAM(S): 2.5 TABLET ORAL at 05:17

## 2018-05-11 RX ADMIN — MONTELUKAST 10 MILLIGRAM(S): 4 TABLET, CHEWABLE ORAL at 11:26

## 2018-05-11 NOTE — DISCHARGE NOTE ADULT - PATIENT PORTAL LINK FT
You can access the [facility] Patient Portal, offered by [facility], by registering with the following website: [email or web address]

## 2018-05-11 NOTE — PROGRESS NOTE ADULT - PROVIDER SPECIALTY LIST ADULT
Hospitalist
Infectious Disease
Internal Medicine
Hospitalist
Infectious Disease
Hospitalist

## 2018-05-11 NOTE — DIETITIAN INITIAL EVALUATION ADULT. - OTHER INFO
initial assessment for LOS p/w: low back pain a/w right lower extremity pain x 3 day duration, -neurosurgery following, likely spinal stenosis,  PT/rehab- rec STR, asthma/copd, stable- stable,

## 2018-05-11 NOTE — PROGRESS NOTE ADULT - ASSESSMENT
a/p:  57 yo woman with h/o copd, dvt/pe on coumadin (follows at [facility] coumadin clinic), htn, blaine on cpap and DM2 a/w worsening LBP with radiation to leg     pcp- Dr. Jin Sahu    #LBP- djd/spinal stenosis  -pain improving slowly on gabapentin  -f/u with pain management as outpatient  -outpatient PT  -rolling walker for ambulating  -fall precautions        #AF  -continue coumadin---inr today 1.69  -f/u with coumadin clinic monday    #cystitis  -cont with po cipro (today day #5---2 more days then stop)      DISCHARGE home today   outpatient PT and pain management- f/u with ortho as otupatient for possible knee replacement (has significant knee pain)  denied coverage for SNF for gait rehab  f/u with pcp Dr. Sahu within 1-2 weeks

## 2018-05-11 NOTE — DISCHARGE NOTE ADULT - HOSPITAL COURSE
Patient admitted for LBP radiating to RLE, +SLR. Seen by Neurosurgery, LS MRI revealed stenosis at L3-L4. Patient recommended for pain management and PT. Pain team did not recommend epidural injection due to active coumadin use for DVT, instead suggesting neurontin. Patient was found to have cystitis, and treated with cipro for a total of 10 days. She tolerated up titration of neurontin to 400mg TID. She has worked with PT, and will continue to work with outpatient PT. She is stable for discharge to home with appropriate medical and neurosurgery follow up.

## 2018-05-11 NOTE — PROGRESS NOTE ADULT - SUBJECTIVE AND OBJECTIVE BOX
patient seen and examined independently on morning rounds, chart reviewed and discussed with medicine resident and agree with the above discharge note with following addendum:    time spendt on discharge >30 minutes including coordination of discharge care      Patient is a 58y old  Female who presents with a chief complaint of low back pain a/w right lower extremity pain x 3 day duration (11 May 2018 15:36)    HPI:  59 y/o female with history of asthma , DVT/PE on coumadin , copd, htn , YOLIE on cpap , presented to ER for c/o low back pain radiating to right leg x 3 days  As per patient back pain was sudden in onset , first noticed 3 days ptp , sharp shooting type, 8/10 in intensity worsened by walking and movement .No h/o trauma or fall .denies urinary or fecal incontinence , fever, chills , diarrhea , constipation .  does c/o dysuria x 3 days   denies any history of heavy weight lifting or recent strenuous activity , no h/o similar complaints in the past  at baseline patient uses a cane for ambulation (05 May 2018 21:06)    PAST MEDICAL & SURGICAL HISTORY:  Atrial fibrillation, unspecified type  Congestive heart failure, unspecified HF chronicity, unspecified heart failure type  Sleep apnea, unspecified type  Gastroesophageal reflux disease without esophagitis  Asthma, unspecified asthma severity, unspecified whether complicated, unspecified whether persistent  Chronic obstructive pulmonary disease, unspecified COPD type  PE (pulmonary thromboembolism)  DVT (deep venous thrombosis)  No significant past surgical history    no overnight events- stable for discharge home today- rx for rolling walker and outaptient PT      Vital Signs Last 24 Hrs  T(C): 36 (11 May 2018 13:15), Max: 37.3 (11 May 2018 06:28)  T(F): 96.8 (11 May 2018 13:15), Max: 99.1 (11 May 2018 06:28)  HR: 91 (11 May 2018 13:15) (72 - 91)  BP: 105/52 (11 May 2018 13:15) (104/52 - 109/62)  BP(mean): --  RR: 18 (11 May 2018 13:15) (18 - 18)  SpO2: --    PE:  GEN-NAD, AAOx3  HEENT- NCAT, PERRLA, EOMI  NECK- supple no jvd, no lymphadenopathy  CHEST- Clear to auscultation bilaterally, fair air entry  CVS- +s1/s2 RRR no murmurs  GI- soft NT ND +bs, no rebound, no guarding  EXT- no edema  SKIN- no rashes/no lesions  NEURO- nonfocal                        12.5   5.45  )-----------( 237      ( 11 May 2018 07:48 )             39.4     05-11    142  |  99  |  13  ----------------------------<  108<H>  4.4   |  29  |  0.8    Ca    9.4      11 May 2018 07:48                MEDICATIONS  (STANDING):  amLODIPine   Tablet 10 milliGRAM(s) Oral daily  buDESOnide 160 MICROgram(s)/formoterol 4.5 MICROgram(s) Inhaler 2 Puff(s) Inhalation two times a day  ciprofloxacin     Tablet 250 milliGRAM(s) Oral every 12 hours  fluticasone propionate 50 MICROgram(s)/spray Nasal Spray 1 Spray(s) Both Nostrils two times a day  furosemide    Tablet 20 milliGRAM(s) Oral daily  gabapentin 400 milliGRAM(s) Oral three times a day  hydrocortisone 0.5% Cream 1 Application(s) Topical two times a day  lisinopril 10 milliGRAM(s) Oral daily  montelukast 10 milliGRAM(s) Oral daily  pantoprazole    Tablet 40 milliGRAM(s) Oral before breakfast  sodium chloride 0.9% lock flush 3 milliLiter(s) IV Push every 8 hours

## 2018-05-11 NOTE — DISCHARGE NOTE ADULT - MEDICATION SUMMARY - MEDICATIONS TO TAKE
I will START or STAY ON the medications listed below when I get home from the hospital:    RAMIPRIL     CAP 2.5MG  -- 1  by mouth once a day  -- Indication: For HTN    WARFARIN     TAB 10MG  -- 1 tab(s) by mouth once every other day, alternating with half tablet by mouth once every other day  -- Indication: For DVT    gabapentin 400 mg oral capsule  -- 1 cap(s) by mouth 3 times a day  -- Indication: For BACK PAIN    VENTOLIN HFA AER  -- Indication: For Asthma, unspecified asthma severity, unspecified whether complicated, unspecified whether persistent    SYMBICORT    -4.5  -- Indication: For Asthma, unspecified asthma severity, unspecified whether complicated, unspecified whether persistent    Hydrocortisone-Aloe 0.5% topical cream  -- 1 application on skin 2 times a day  -- Indication: For Skin irritation    FUROSEMIDE   TAB 20MG  -- Indication: For Congestive heart failure, unspecified HF chronicity, unspecified heart failure type    MONTELUKAST  TAB 10MG  -- Indication: For Asthma, unspecified asthma severity, unspecified whether complicated, unspecified whether persistent    FLUTICASONE  SPR 50MCG  -- Indication: For Asthma, unspecified asthma severity, unspecified whether complicated, unspecified whether persistent    PANTOPRAZOLE TAB 40MG  -- Indication: For Gastroesophageal reflux disease without esophagitis    ciprofloxacin 250 mg oral tablet  -- 1 tab(s) by mouth every 12 hours  -- Indication: For Cystitis

## 2018-05-11 NOTE — DIETITIAN INITIAL EVALUATION ADULT. - ENERGY NEEDS
calorie 1137-1365kcal (25-30kcal/kg IBW) for obesity/BMI >40  protein 46-55g (1-1.2g/kg IBW) for obesity?BMI >40  fluid 1ml/kcal

## 2018-05-11 NOTE — DISCHARGE NOTE ADULT - PLAN OF CARE
work up, treatment, prevention of complications your back pain was evaluated with MRI, and it was determined that you have lumbar spinal stenosis. Neurosurgery evaluated your condition, as did pain management. Please work with physical therapy to improve your condition. Please take medications as prescribed. please follow up with your primary care doctor and neurosurgeon for further recommendations. please complete antibiotic course as prescribed. please follow up with your primary care doctor for further management instructions.

## 2018-05-11 NOTE — DISCHARGE NOTE ADULT - CARE PROVIDER_API CALL
Jin Jarvis), Internal Medicine; Pulmonary Disease; Sleep Medicine  [address] [place] [address]  Phone: [phone]  Fax: [phone]    Waqas Yang), Neurological Surgery  [address] [place] [address]  Phone: [phone]  Fax: [phone]

## 2018-05-11 NOTE — DISCHARGE NOTE ADULT - CARE PLAN
Principal Discharge DX:	Back pain  Goal:	work up, treatment, prevention of complications  Assessment and plan of treatment:	your back pain was evaluated with MRI, and it was determined that you have lumbar spinal stenosis. Neurosurgery evaluated your condition, as did pain management. Please work with physical therapy to improve your condition. Please take medications as prescribed. please follow up with your primary care doctor and neurosurgeon for further recommendations.  Secondary Diagnosis:	Cystitis  Goal:	work up, treatment, prevention of complications  Assessment and plan of treatment:	please complete antibiotic course as prescribed. please follow up with your primary care doctor for further management instructions.

## 2018-05-11 NOTE — DISCHARGE NOTE ADULT - MEDICATION SUMMARY - MEDICATIONS TO STOP TAKING
I will STOP taking the medications listed below when I get home from the hospital:    AMLODIPINE   TAB 10MG

## 2018-05-11 NOTE — DIETITIAN INITIAL EVALUATION ADULT. - FACTORS AFF FOOD INTAKE
reports medication induced nausea but not severe enough to prevent her from eating at this time, last BM 5/10/other (specify)

## 2018-05-15 ENCOUNTER — OUTPATIENT (OUTPATIENT)
Dept: OUTPATIENT SERVICES | Facility: HOSPITAL | Age: 58
LOS: 1 days | Discharge: HOME | End: 2018-05-15

## 2018-05-15 DIAGNOSIS — I27.82 CHRONIC PULMONARY EMBOLISM: ICD-10-CM

## 2018-05-15 DIAGNOSIS — Z79.01 LONG TERM (CURRENT) USE OF ANTICOAGULANTS: ICD-10-CM

## 2018-05-15 PROBLEM — I50.9 HEART FAILURE, UNSPECIFIED: Chronic | Status: ACTIVE | Noted: 2018-05-05

## 2018-05-15 PROBLEM — I26.99 OTHER PULMONARY EMBOLISM WITHOUT ACUTE COR PULMONALE: Chronic | Status: ACTIVE | Noted: 2018-05-05

## 2018-05-15 PROBLEM — J44.9 CHRONIC OBSTRUCTIVE PULMONARY DISEASE, UNSPECIFIED: Chronic | Status: ACTIVE | Noted: 2018-05-05

## 2018-05-15 PROBLEM — G47.30 SLEEP APNEA, UNSPECIFIED: Chronic | Status: ACTIVE | Noted: 2018-05-05

## 2018-05-15 PROBLEM — K21.9 GASTRO-ESOPHAGEAL REFLUX DISEASE WITHOUT ESOPHAGITIS: Chronic | Status: ACTIVE | Noted: 2018-05-05

## 2018-05-15 PROBLEM — I48.91 UNSPECIFIED ATRIAL FIBRILLATION: Chronic | Status: ACTIVE | Noted: 2018-05-05

## 2018-05-15 PROBLEM — J45.909 UNSPECIFIED ASTHMA, UNCOMPLICATED: Chronic | Status: ACTIVE | Noted: 2018-05-05

## 2018-05-17 DIAGNOSIS — N30.90 CYSTITIS, UNSPECIFIED WITHOUT HEMATURIA: ICD-10-CM

## 2018-05-17 DIAGNOSIS — Z86.718 PERSONAL HISTORY OF OTHER VENOUS THROMBOSIS AND EMBOLISM: ICD-10-CM

## 2018-05-17 DIAGNOSIS — I48.91 UNSPECIFIED ATRIAL FIBRILLATION: ICD-10-CM

## 2018-05-17 DIAGNOSIS — I50.9 HEART FAILURE, UNSPECIFIED: ICD-10-CM

## 2018-05-17 DIAGNOSIS — K21.9 GASTRO-ESOPHAGEAL REFLUX DISEASE WITHOUT ESOPHAGITIS: ICD-10-CM

## 2018-05-17 DIAGNOSIS — Z88.0 ALLERGY STATUS TO PENICILLIN: ICD-10-CM

## 2018-05-17 DIAGNOSIS — M54.5 LOW BACK PAIN: ICD-10-CM

## 2018-05-17 DIAGNOSIS — Z79.01 LONG TERM (CURRENT) USE OF ANTICOAGULANTS: ICD-10-CM

## 2018-05-17 DIAGNOSIS — G47.33 OBSTRUCTIVE SLEEP APNEA (ADULT) (PEDIATRIC): ICD-10-CM

## 2018-05-17 DIAGNOSIS — T45.515A ADVERSE EFFECT OF ANTICOAGULANTS, INITIAL ENCOUNTER: ICD-10-CM

## 2018-05-17 DIAGNOSIS — I11.0 HYPERTENSIVE HEART DISEASE WITH HEART FAILURE: ICD-10-CM

## 2018-05-17 DIAGNOSIS — M47.26 OTHER SPONDYLOSIS WITH RADICULOPATHY, LUMBAR REGION: ICD-10-CM

## 2018-05-17 DIAGNOSIS — E66.9 OBESITY, UNSPECIFIED: ICD-10-CM

## 2018-05-17 DIAGNOSIS — R26.89 OTHER ABNORMALITIES OF GAIT AND MOBILITY: ICD-10-CM

## 2018-05-17 DIAGNOSIS — J44.9 CHRONIC OBSTRUCTIVE PULMONARY DISEASE, UNSPECIFIED: ICD-10-CM

## 2018-05-17 DIAGNOSIS — R79.1 ABNORMAL COAGULATION PROFILE: ICD-10-CM

## 2018-05-17 DIAGNOSIS — E11.9 TYPE 2 DIABETES MELLITUS WITHOUT COMPLICATIONS: ICD-10-CM

## 2018-05-17 DIAGNOSIS — R53.81 OTHER MALAISE: ICD-10-CM

## 2018-05-17 DIAGNOSIS — Z96.652 PRESENCE OF LEFT ARTIFICIAL KNEE JOINT: ICD-10-CM

## 2018-05-17 DIAGNOSIS — Z86.711 PERSONAL HISTORY OF PULMONARY EMBOLISM: ICD-10-CM

## 2018-05-17 DIAGNOSIS — M48.061 SPINAL STENOSIS, LUMBAR REGION WITHOUT NEUROGENIC CLAUDICATION: ICD-10-CM

## 2018-05-17 DIAGNOSIS — M17.11 UNILATERAL PRIMARY OSTEOARTHRITIS, RIGHT KNEE: ICD-10-CM

## 2018-06-04 ENCOUNTER — OUTPATIENT (OUTPATIENT)
Dept: OUTPATIENT SERVICES | Facility: HOSPITAL | Age: 58
LOS: 1 days | Discharge: HOME | End: 2018-06-04

## 2018-06-04 DIAGNOSIS — Z79.01 LONG TERM (CURRENT) USE OF ANTICOAGULANTS: ICD-10-CM

## 2018-06-04 DIAGNOSIS — I27.82 CHRONIC PULMONARY EMBOLISM: ICD-10-CM

## 2018-06-26 ENCOUNTER — OUTPATIENT (OUTPATIENT)
Dept: OUTPATIENT SERVICES | Facility: HOSPITAL | Age: 58
LOS: 1 days | Discharge: HOME | End: 2018-06-26

## 2018-06-26 DIAGNOSIS — I48.91 UNSPECIFIED ATRIAL FIBRILLATION: ICD-10-CM

## 2018-06-26 DIAGNOSIS — Z79.01 LONG TERM (CURRENT) USE OF ANTICOAGULANTS: ICD-10-CM

## 2018-07-24 ENCOUNTER — OUTPATIENT (OUTPATIENT)
Dept: OUTPATIENT SERVICES | Facility: HOSPITAL | Age: 58
LOS: 1 days | Discharge: HOME | End: 2018-07-24

## 2018-07-24 DIAGNOSIS — I48.91 UNSPECIFIED ATRIAL FIBRILLATION: ICD-10-CM

## 2018-07-24 DIAGNOSIS — Z79.01 LONG TERM (CURRENT) USE OF ANTICOAGULANTS: ICD-10-CM

## 2018-07-24 PROBLEM — I82.409 ACUTE EMBOLISM AND THROMBOSIS OF UNSPECIFIED DEEP VEINS OF UNSPECIFIED LOWER EXTREMITY: Chronic | Status: ACTIVE | Noted: 2018-04-06

## 2018-08-21 ENCOUNTER — OUTPATIENT (OUTPATIENT)
Dept: OUTPATIENT SERVICES | Facility: HOSPITAL | Age: 58
LOS: 1 days | Discharge: HOME | End: 2018-08-21

## 2018-08-21 DIAGNOSIS — Z79.01 LONG TERM (CURRENT) USE OF ANTICOAGULANTS: ICD-10-CM

## 2018-08-21 DIAGNOSIS — I48.91 UNSPECIFIED ATRIAL FIBRILLATION: ICD-10-CM

## 2018-08-21 LAB
POCT INR: 2.4 RATIO — HIGH (ref 0.9–1.2)
POCT PT: 28.4 SEC — HIGH (ref 10–13.4)

## 2018-10-04 ENCOUNTER — OUTPATIENT (OUTPATIENT)
Dept: OUTPATIENT SERVICES | Facility: HOSPITAL | Age: 58
LOS: 1 days | Discharge: HOME | End: 2018-10-04

## 2018-10-04 DIAGNOSIS — Z79.01 LONG TERM (CURRENT) USE OF ANTICOAGULANTS: ICD-10-CM

## 2018-10-04 DIAGNOSIS — I48.91 UNSPECIFIED ATRIAL FIBRILLATION: ICD-10-CM

## 2018-10-04 LAB
POCT INR: 2.1 RATIO — HIGH (ref 0.9–1.2)
POCT PT: 25.3 SEC — HIGH (ref 10–13.4)

## 2018-10-11 ENCOUNTER — EMERGENCY (EMERGENCY)
Facility: HOSPITAL | Age: 58
LOS: 0 days | Discharge: HOME | End: 2018-10-11
Attending: STUDENT IN AN ORGANIZED HEALTH CARE EDUCATION/TRAINING PROGRAM | Admitting: STUDENT IN AN ORGANIZED HEALTH CARE EDUCATION/TRAINING PROGRAM

## 2018-10-11 ENCOUNTER — APPOINTMENT (OUTPATIENT)
Dept: NEUROSURGERY | Facility: CLINIC | Age: 58
End: 2018-10-11
Payer: MEDICARE

## 2018-10-11 VITALS — HEIGHT: 65 IN | BODY MASS INDEX: 46.65 KG/M2 | WEIGHT: 280 LBS

## 2018-10-11 VITALS
RESPIRATION RATE: 18 BRPM | HEART RATE: 89 BPM | DIASTOLIC BLOOD PRESSURE: 58 MMHG | TEMPERATURE: 96 F | SYSTOLIC BLOOD PRESSURE: 138 MMHG | OXYGEN SATURATION: 97 %

## 2018-10-11 DIAGNOSIS — Z88.0 ALLERGY STATUS TO PENICILLIN: ICD-10-CM

## 2018-10-11 DIAGNOSIS — L50.9 URTICARIA, UNSPECIFIED: ICD-10-CM

## 2018-10-11 DIAGNOSIS — K21.9 GASTRO-ESOPHAGEAL REFLUX DISEASE WITHOUT ESOPHAGITIS: ICD-10-CM

## 2018-10-11 DIAGNOSIS — Z79.01 LONG TERM (CURRENT) USE OF ANTICOAGULANTS: ICD-10-CM

## 2018-10-11 DIAGNOSIS — I50.9 HEART FAILURE, UNSPECIFIED: ICD-10-CM

## 2018-10-11 DIAGNOSIS — R21 RASH AND OTHER NONSPECIFIC SKIN ERUPTION: ICD-10-CM

## 2018-10-11 DIAGNOSIS — Z79.51 LONG TERM (CURRENT) USE OF INHALED STEROIDS: ICD-10-CM

## 2018-10-11 DIAGNOSIS — Z96.659 PRESENCE OF UNSPECIFIED ARTIFICIAL KNEE JOINT: Chronic | ICD-10-CM

## 2018-10-11 DIAGNOSIS — Z79.899 OTHER LONG TERM (CURRENT) DRUG THERAPY: ICD-10-CM

## 2018-10-11 DIAGNOSIS — I10 ESSENTIAL (PRIMARY) HYPERTENSION: ICD-10-CM

## 2018-10-11 DIAGNOSIS — J45.909 UNSPECIFIED ASTHMA, UNCOMPLICATED: ICD-10-CM

## 2018-10-11 PROCEDURE — 99212 OFFICE O/P EST SF 10 MIN: CPT

## 2018-10-11 RX ORDER — HYDROXYZINE HCL 10 MG
50 TABLET ORAL ONCE
Qty: 0 | Refills: 0 | Status: COMPLETED | OUTPATIENT
Start: 2018-10-11 | End: 2018-10-11

## 2018-10-11 RX ORDER — AMLODIPINE BESYLATE 10 MG/1
10 TABLET ORAL
Refills: 0 | Status: DISCONTINUED | COMMUNITY
End: 2018-10-11

## 2018-10-11 RX ORDER — SIMVASTATIN 80 MG/1
TABLET, FILM COATED ORAL
Refills: 0 | Status: DISCONTINUED | COMMUNITY
End: 2018-10-11

## 2018-10-11 RX ORDER — EPINEPHRINE 0.3 MG/.3ML
0.3 INJECTION INTRAMUSCULAR; SUBCUTANEOUS
Qty: 1 | Refills: 0 | OUTPATIENT
Start: 2018-10-11 | End: 2018-10-11

## 2018-10-11 RX ORDER — PANTOPRAZOLE 40 MG/1
TABLET, DELAYED RELEASE ORAL
Refills: 0 | Status: DISCONTINUED | COMMUNITY
End: 2018-10-11

## 2018-10-11 RX ORDER — FAMOTIDINE 10 MG/ML
20 INJECTION INTRAVENOUS ONCE
Qty: 0 | Refills: 0 | Status: COMPLETED | OUTPATIENT
Start: 2018-10-11 | End: 2018-10-11

## 2018-10-11 RX ORDER — METFORMIN HYDROCHLORIDE 850 MG/1
850 TABLET, COATED ORAL
Refills: 0 | Status: DISCONTINUED | COMMUNITY
End: 2018-10-11

## 2018-10-11 RX ADMIN — Medication 50 MILLIGRAM(S): at 17:30

## 2018-10-11 RX ADMIN — Medication 60 MILLIGRAM(S): at 17:16

## 2018-10-11 RX ADMIN — FAMOTIDINE 20 MILLIGRAM(S): 10 INJECTION INTRAVENOUS at 17:16

## 2018-10-11 NOTE — ED PROVIDER NOTE - MEDICAL DECISION MAKING DETAILS
urticaria of unclear source  no anaphylaxis  will rx/ hydroxyzine, h2 blocker, prednisone w/ taper. will d/c w/ epi pen prn  will give allergy f/u urticaria of unclear source  no anaphylaxis  Note complete  will rx/ hydroxyzine, h2 blocker, prednisone w/ taper. will d/c w/ epi pen prn  will give allergy f/u

## 2018-10-11 NOTE — ED PROVIDER NOTE - OBJECTIVE STATEMENT
58 y.o. female with a PMHx of GERD, HTN, YOLIE, Asthma, Left DVT and PE (INR done monthly, last week 2.3) presented to the ER c/o diffuse urticaria intermittently for the past month.  Pt denies h/o travel, fever, chills, dysphagia, SOB.  Pt has been taking Benadryl intermittently with some relief. (+) URI symptoms recently.

## 2018-10-11 NOTE — ED PROVIDER NOTE - ATTENDING CONTRIBUTION TO CARE
59 yo f hx dvt/pe on coumadin, asthma, htn, blaine here for rash. pt endorsing 1 month of diffuse hives. slightly worsening over the month. no change in meds. change in cleaning supplies which were reversed. no throat swelling/dyspnea/AP/n/v/d/dizziness/syncope/cp.  pt here because she could not adequately control itching w/ prn benadryl. pt saw pcp before and he scheduled her to have skin testing in 9 days. no fever. no sloughing of rash    vss  card-rrr  lungs-ctab  abd-sntnd  skin- diffuse urticarial hives to chest/abdomen/back, some on arms and face  ENT-no uvula swelling, no tonsillar swelling, several 2mm white lesions to hard palate. no bleeding when scraped. no hand lesions    urticaria of unclear source  no anaphylaxis  will rx/ hydroxyzine, h2 blocker, prednisone w/ taper. will d/c w/ epi pen prn  will give allergy f/u

## 2018-10-11 NOTE — ED ADULT TRIAGE NOTE - CHIEF COMPLAINT QUOTE
diffuse hives x 1 months, patient states " im supposed to have a skin test on the 10/20 but I cant wait".

## 2018-10-22 ENCOUNTER — OUTPATIENT (OUTPATIENT)
Dept: OUTPATIENT SERVICES | Facility: HOSPITAL | Age: 58
LOS: 1 days | Discharge: HOME | End: 2018-10-22

## 2018-10-22 DIAGNOSIS — I48.91 UNSPECIFIED ATRIAL FIBRILLATION: ICD-10-CM

## 2018-10-22 DIAGNOSIS — Z79.01 LONG TERM (CURRENT) USE OF ANTICOAGULANTS: ICD-10-CM

## 2018-10-22 DIAGNOSIS — Z96.659 PRESENCE OF UNSPECIFIED ARTIFICIAL KNEE JOINT: Chronic | ICD-10-CM

## 2018-10-22 LAB
POCT INR: 3.3 RATIO — HIGH (ref 0.9–1.2)
POCT PT: 39.3 SEC — HIGH (ref 10–13.4)

## 2018-11-11 ENCOUNTER — FORM ENCOUNTER (OUTPATIENT)
Age: 58
End: 2018-11-11

## 2018-11-12 ENCOUNTER — APPOINTMENT (OUTPATIENT)
Dept: NEUROSURGERY | Facility: CLINIC | Age: 58
End: 2018-11-12
Payer: MEDICARE

## 2018-11-12 ENCOUNTER — OUTPATIENT (OUTPATIENT)
Dept: OUTPATIENT SERVICES | Facility: HOSPITAL | Age: 58
LOS: 1 days | Discharge: HOME | End: 2018-11-12

## 2018-11-12 DIAGNOSIS — M47.816 SPONDYLOSIS W/OUT MYELOPATHY OR RADICULOPATHY, LUMBAR REGION: ICD-10-CM

## 2018-11-12 DIAGNOSIS — S32.000A WEDGE COMPRESSION FRACTURE OF UNSPECIFIED LUMBAR VERTEBRA, INITIAL ENCOUNTER FOR CLOSED FRACTURE: ICD-10-CM

## 2018-11-12 DIAGNOSIS — Z96.659 PRESENCE OF UNSPECIFIED ARTIFICIAL KNEE JOINT: Chronic | ICD-10-CM

## 2018-11-12 DIAGNOSIS — M25.551 PAIN IN RIGHT HIP: ICD-10-CM

## 2018-11-12 PROCEDURE — 99213 OFFICE O/P EST LOW 20 MIN: CPT

## 2018-12-03 ENCOUNTER — OUTPATIENT (OUTPATIENT)
Dept: OUTPATIENT SERVICES | Facility: HOSPITAL | Age: 58
LOS: 1 days | Discharge: HOME | End: 2018-12-03

## 2018-12-03 DIAGNOSIS — K21.9 GASTRO-ESOPHAGEAL REFLUX DISEASE WITHOUT ESOPHAGITIS: ICD-10-CM

## 2018-12-03 DIAGNOSIS — I48.91 UNSPECIFIED ATRIAL FIBRILLATION: ICD-10-CM

## 2018-12-03 DIAGNOSIS — Z96.659 PRESENCE OF UNSPECIFIED ARTIFICIAL KNEE JOINT: Chronic | ICD-10-CM

## 2018-12-03 DIAGNOSIS — Z79.01 LONG TERM (CURRENT) USE OF ANTICOAGULANTS: ICD-10-CM

## 2018-12-03 DIAGNOSIS — L50.9 URTICARIA, UNSPECIFIED: ICD-10-CM

## 2018-12-03 DIAGNOSIS — I82.432 ACUTE EMBOLISM AND THROMBOSIS OF LEFT POPLITEAL VEIN: ICD-10-CM

## 2018-12-03 DIAGNOSIS — I26.99 OTHER PULMONARY EMBOLISM WITHOUT ACUTE COR PULMONALE: ICD-10-CM

## 2018-12-03 DIAGNOSIS — I10 ESSENTIAL (PRIMARY) HYPERTENSION: ICD-10-CM

## 2018-12-03 DIAGNOSIS — J45.40 MODERATE PERSISTENT ASTHMA, UNCOMPLICATED: ICD-10-CM

## 2019-01-08 ENCOUNTER — OUTPATIENT (OUTPATIENT)
Dept: OUTPATIENT SERVICES | Facility: HOSPITAL | Age: 59
LOS: 1 days | Discharge: HOME | End: 2019-01-08

## 2019-01-08 DIAGNOSIS — I48.91 UNSPECIFIED ATRIAL FIBRILLATION: ICD-10-CM

## 2019-01-08 DIAGNOSIS — Z79.01 LONG TERM (CURRENT) USE OF ANTICOAGULANTS: ICD-10-CM

## 2019-01-08 DIAGNOSIS — Z96.659 PRESENCE OF UNSPECIFIED ARTIFICIAL KNEE JOINT: Chronic | ICD-10-CM

## 2019-01-08 LAB
POCT INR: 3.1 RATIO — HIGH (ref 0.9–1.2)
POCT PT: 37.2 SEC — HIGH (ref 10–13.4)

## 2019-02-19 ENCOUNTER — OUTPATIENT (OUTPATIENT)
Dept: OUTPATIENT SERVICES | Facility: HOSPITAL | Age: 59
LOS: 1 days | Discharge: HOME | End: 2019-02-19

## 2019-02-19 DIAGNOSIS — Z79.01 LONG TERM (CURRENT) USE OF ANTICOAGULANTS: ICD-10-CM

## 2019-02-19 DIAGNOSIS — I48.91 UNSPECIFIED ATRIAL FIBRILLATION: ICD-10-CM

## 2019-02-19 DIAGNOSIS — Z96.659 PRESENCE OF UNSPECIFIED ARTIFICIAL KNEE JOINT: Chronic | ICD-10-CM

## 2019-02-19 LAB
POCT INR: 3.1 RATIO — HIGH (ref 0.9–1.2)
POCT PT: 37.1 SEC — HIGH (ref 10–13.4)

## 2019-03-26 ENCOUNTER — OUTPATIENT (OUTPATIENT)
Dept: OUTPATIENT SERVICES | Facility: HOSPITAL | Age: 59
LOS: 1 days | Discharge: HOME | End: 2019-03-26

## 2019-03-26 DIAGNOSIS — Z79.01 LONG TERM (CURRENT) USE OF ANTICOAGULANTS: ICD-10-CM

## 2019-03-26 DIAGNOSIS — I48.91 UNSPECIFIED ATRIAL FIBRILLATION: ICD-10-CM

## 2019-03-26 DIAGNOSIS — Z96.659 PRESENCE OF UNSPECIFIED ARTIFICIAL KNEE JOINT: Chronic | ICD-10-CM

## 2019-03-26 LAB
POCT INR: 2.9 RATIO — HIGH (ref 0.9–1.2)
POCT PT: 34.6 SEC — HIGH (ref 10–13.4)

## 2019-04-11 NOTE — ED ADULT NURSE NOTE - PMH
Asthma, unspecified asthma severity, unspecified whether complicated, unspecified whether persistent    Atrial fibrillation, unspecified type    Chronic obstructive pulmonary disease, unspecified COPD type    Congestive heart failure, unspecified HF chronicity, unspecified heart failure type    DVT (deep venous thrombosis)    Gastroesophageal reflux disease without esophagitis    PE (pulmonary thromboembolism)    Sleep apnea, unspecified type No difficulties

## 2019-05-01 ENCOUNTER — OUTPATIENT (OUTPATIENT)
Dept: OUTPATIENT SERVICES | Facility: HOSPITAL | Age: 59
LOS: 1 days | Discharge: HOME | End: 2019-05-01

## 2019-05-01 DIAGNOSIS — Z79.01 LONG TERM (CURRENT) USE OF ANTICOAGULANTS: ICD-10-CM

## 2019-05-01 DIAGNOSIS — Z96.659 PRESENCE OF UNSPECIFIED ARTIFICIAL KNEE JOINT: Chronic | ICD-10-CM

## 2019-05-01 DIAGNOSIS — I48.91 UNSPECIFIED ATRIAL FIBRILLATION: ICD-10-CM

## 2019-05-01 LAB
POCT INR: 3 RATIO — HIGH (ref 0.9–1.2)
POCT PT: 36.6 SEC — HIGH (ref 10–13.4)

## 2019-06-17 ENCOUNTER — OUTPATIENT (OUTPATIENT)
Dept: OUTPATIENT SERVICES | Facility: HOSPITAL | Age: 59
LOS: 1 days | Discharge: HOME | End: 2019-06-17

## 2019-06-17 DIAGNOSIS — Z96.659 PRESENCE OF UNSPECIFIED ARTIFICIAL KNEE JOINT: Chronic | ICD-10-CM

## 2019-06-17 DIAGNOSIS — R12 HEARTBURN: ICD-10-CM

## 2019-06-17 DIAGNOSIS — K44.9 DIAPHRAGMATIC HERNIA WITHOUT OBSTRUCTION OR GANGRENE: ICD-10-CM

## 2019-06-17 DIAGNOSIS — K59.00 CONSTIPATION, UNSPECIFIED: ICD-10-CM

## 2019-06-17 DIAGNOSIS — K58.1 IRRITABLE BOWEL SYNDROME WITH CONSTIPATION: ICD-10-CM

## 2019-06-17 DIAGNOSIS — K20.9 ESOPHAGITIS, UNSPECIFIED: ICD-10-CM

## 2019-06-17 DIAGNOSIS — R14.0 ABDOMINAL DISTENSION (GASEOUS): ICD-10-CM

## 2019-09-18 ENCOUNTER — EMERGENCY (EMERGENCY)
Facility: HOSPITAL | Age: 59
LOS: 0 days | Discharge: HOME | End: 2019-09-18
Admitting: STUDENT IN AN ORGANIZED HEALTH CARE EDUCATION/TRAINING PROGRAM
Payer: MEDICARE

## 2019-09-18 VITALS
RESPIRATION RATE: 18 BRPM | HEART RATE: 86 BPM | TEMPERATURE: 98 F | DIASTOLIC BLOOD PRESSURE: 86 MMHG | WEIGHT: 259.93 LBS | OXYGEN SATURATION: 98 % | SYSTOLIC BLOOD PRESSURE: 157 MMHG

## 2019-09-18 DIAGNOSIS — I10 ESSENTIAL (PRIMARY) HYPERTENSION: ICD-10-CM

## 2019-09-18 DIAGNOSIS — R21 RASH AND OTHER NONSPECIFIC SKIN ERUPTION: ICD-10-CM

## 2019-09-18 DIAGNOSIS — Z88.0 ALLERGY STATUS TO PENICILLIN: ICD-10-CM

## 2019-09-18 DIAGNOSIS — L50.0 ALLERGIC URTICARIA: ICD-10-CM

## 2019-09-18 DIAGNOSIS — T78.40XA ALLERGY, UNSPECIFIED, INITIAL ENCOUNTER: ICD-10-CM

## 2019-09-18 DIAGNOSIS — Z96.659 PRESENCE OF UNSPECIFIED ARTIFICIAL KNEE JOINT: Chronic | ICD-10-CM

## 2019-09-18 PROCEDURE — 99284 EMERGENCY DEPT VISIT MOD MDM: CPT

## 2019-09-18 RX ORDER — EPINEPHRINE 0.3 MG/.3ML
1 INJECTION INTRAMUSCULAR; SUBCUTANEOUS
Qty: 1 | Refills: 0
Start: 2019-09-18 | End: 2019-09-18

## 2019-09-18 RX ORDER — CETIRIZINE HYDROCHLORIDE 10 MG/1
1 TABLET ORAL
Qty: 14 | Refills: 0
Start: 2019-09-18 | End: 2019-10-01

## 2019-09-18 RX ORDER — DIPHENHYDRAMINE HCL 50 MG
50 CAPSULE ORAL ONCE
Refills: 0 | Status: COMPLETED | OUTPATIENT
Start: 2019-09-18 | End: 2019-09-18

## 2019-09-18 RX ORDER — RANITIDINE HYDROCHLORIDE 150 MG/1
1 TABLET, FILM COATED ORAL
Qty: 28 | Refills: 0
Start: 2019-09-18 | End: 2019-10-01

## 2019-09-18 RX ORDER — FAMOTIDINE 10 MG/ML
20 INJECTION INTRAVENOUS ONCE
Refills: 0 | Status: COMPLETED | OUTPATIENT
Start: 2019-09-18 | End: 2019-09-18

## 2019-09-18 RX ORDER — SODIUM CHLORIDE 9 MG/ML
1000 INJECTION, SOLUTION INTRAVENOUS ONCE
Refills: 0 | Status: COMPLETED | OUTPATIENT
Start: 2019-09-18 | End: 2019-09-18

## 2019-09-18 RX ADMIN — FAMOTIDINE 104 MILLIGRAM(S): 10 INJECTION INTRAVENOUS at 14:48

## 2019-09-18 RX ADMIN — Medication 125 MILLIGRAM(S): at 14:49

## 2019-09-18 RX ADMIN — SODIUM CHLORIDE 1000 MILLILITER(S): 9 INJECTION, SOLUTION INTRAVENOUS at 14:48

## 2019-09-18 RX ADMIN — Medication 50 MILLIGRAM(S): at 14:49

## 2019-09-18 NOTE — ED PROVIDER NOTE - CLINICAL SUMMARY MEDICAL DECISION MAKING FREE TEXT BOX
prednisone taper; Zyrtec: Zantac; allergy referral; pt will follow up with PCP in 2-3 days; any new or worsening symptoms, pt will return to ER.

## 2019-09-18 NOTE — ED PROVIDER NOTE - CARE PROVIDER_API CALL
Katie Hart)  Allergy and Immunology; Internal Medicine  [address] [place] [address]  Phone: [phone]  Fax: [phone]  Follow Up Time: 1-3 Days    [facility] ,   Phone: (   )    -  Fax: (   )    -  Follow Up Time: 1-3 Days

## 2019-09-18 NOTE — ED PROVIDER NOTE - PROVIDER TOKENS
PROVIDER:[TOKEN:[[id number]],FOLLOWUP:[1-3 Days]],FREE:[LAST:[[facility]],PHONE:[(   )    -],FAX:[(   )    -],FOLLOWUP:[1-3 Days]]

## 2019-09-18 NOTE — ED PROVIDER NOTE - OBJECTIVE STATEMENT
59 y.o. female with a PMH DVT, PE, arthritis, and HTN presented to the ER c/o intermittent hives for the past several months, worse over the past 4 days.  Pt denies dysphagia, drooling, difficulty breathing, fever, chills, chest pain, travel, change in diet/meds/detergents.  PCP did skin testing which was (+) for mols and tree pollen.  No other complaints.

## 2019-09-18 NOTE — ED PROVIDER NOTE - PATIENT PORTAL LINK FT
You can access the FollowMyHealth Patient Portal offered by [facility] by registering at the following website: [email or web address]. By joining [employer]’s FollowMyHealth portal, you will also be able to view your health information using other applications (apps) compatible with our system.

## 2019-09-18 NOTE — ED PROVIDER NOTE - NSFOLLOWUPINSTRUCTIONS_ED_ALL_ED_FT
Hives  Image   Hives (urticaria) are itchy, red, swollen areas on your skin. Hives can show up on any part of your body, and they can vary in size. They can be as small as the tip of a pen or much larger. Hives often fade within 24 hours (acute hives). In other cases, new hives show up after old ones fade. This can continue for many days or weeks (chronic hives).    Hives are caused by your body's reaction to an irritant or to something that you are allergic to (trigger). You can get hives right after being around a trigger or hours later. Hives do not spread from person to person (are not contagious). Hives may get worse if you scratch them, if you exercise, or if you have worries (emotional stress).    Follow these instructions at home:  Medicines     Take or apply over-the-counter and prescription medicines only as told by your doctor.  If you were prescribed an antibiotic medicine, use it as told by your doctor. Do not stop taking the antibiotic even if you start to feel better.  Skin Care     Apply cool, wet cloths (cool compresses) to the itchy, red, swollen areas.  Do not scratch your skin. Do not rub your skin.  General instructions     Do not take hot showers or baths. This can make itching worse.  Do not wear tight clothes.  Use sunscreen and wear clothing that covers your skin when you are outside.  Avoid any triggers that cause your hives. Keep a journal to help you keep track of what causes your hives. Write down:  What medicines you take.  What you eat and drink.  What products you use on your skin.  Keep all follow-up visits as told by your doctor. This is important.  Contact a doctor if:  Your symptoms are not better with medicine.  Your joints are painful or swollen.  Get help right away if:  You have a fever.  You have belly pain.  Your tongue or lips are swollen.  Your eyelids are swollen.  Your chest or throat feels tight.  You have trouble breathing or swallowing.  These symptoms may be an emergency. Do not wait to see if the symptoms will go away. Get medical help right away. Call your local emergency services (911 in the [place] Do not drive yourself to the hospital.     This information is not intended to replace advice given to you by your health care provider. Make sure you discuss any questions you have with your health care provider.    Document Released: 09/26/2009 Document Revised: 05/25/2017 Document Reviewed: 10/05/2016  Elsevier Interactive Patient Education © 2019 [employer] Inc[employer]

## 2019-09-18 NOTE — ED PROVIDER NOTE - ENMT, MLM
Airway patent, Nasal mucosa clear. Mouth with normal mucosa. Throat has no vesicles, no oropharyngeal exudates and uvula is midline- no edema.

## 2019-09-28 ENCOUNTER — EMERGENCY (EMERGENCY)
Facility: HOSPITAL | Age: 59
LOS: 0 days | Discharge: HOME | End: 2019-09-28
Admitting: STUDENT IN AN ORGANIZED HEALTH CARE EDUCATION/TRAINING PROGRAM
Payer: MEDICARE

## 2019-09-28 VITALS
RESPIRATION RATE: 18 BRPM | DIASTOLIC BLOOD PRESSURE: 63 MMHG | TEMPERATURE: 97 F | HEART RATE: 89 BPM | OXYGEN SATURATION: 98 % | SYSTOLIC BLOOD PRESSURE: 140 MMHG

## 2019-09-28 VITALS — WEIGHT: 165.35 LBS | HEIGHT: 63 IN

## 2019-09-28 DIAGNOSIS — Z96.659 PRESENCE OF UNSPECIFIED ARTIFICIAL KNEE JOINT: Chronic | ICD-10-CM

## 2019-09-28 DIAGNOSIS — M25.571 PAIN IN RIGHT ANKLE AND JOINTS OF RIGHT FOOT: ICD-10-CM

## 2019-09-28 PROCEDURE — 99283 EMERGENCY DEPT VISIT LOW MDM: CPT

## 2019-09-28 PROCEDURE — 73600 X-RAY EXAM OF ANKLE: CPT | Mod: 26,RT

## 2019-09-28 RX ORDER — IBUPROFEN 200 MG
600 TABLET ORAL ONCE
Refills: 0 | Status: COMPLETED | OUTPATIENT
Start: 2019-09-28 | End: 2019-09-28

## 2019-09-28 RX ADMIN — Medication 600 MILLIGRAM(S): at 14:25

## 2019-09-28 NOTE — ED PROVIDER NOTE - PHYSICAL EXAMINATION
CONST: Well appearing in NAD  MS: (+) flat feet Mild medial R malleolus tenderness. No swelling or ecchymosis.  SKIN: Warm, dry, no acute rashes. Good turgor  NEURO: A&Ox3, No focal deficits. Strength 5/5 with no sensory deficits.

## 2019-09-28 NOTE — ED PROVIDER NOTE - NSFOLLOWUPINSTRUCTIONS_ED_ALL_ED_FT
Flat Feet, Adult  Image   Normally, a foot has a curve, called an arch, on its inner side. The arch creates a gap between the foot and the ground. Flat feet is a common condition in which one or both feet do not have an arch.    What are the causes?  This condition may be caused by:  Failure of a normal arch to develop during childhood.  An injury to tendons and ligaments in the foot, such as to the tendon that supports the arch (posterior tibial tendon).  Loose tendons or ligaments in the foot.  A wearing down of the arch over time.  Injury to bones in the foot.  An abnormality in the bones of the foot, called tarsal coalition. This happens when two or more bones in the foot are joined together (fused) before birth.  What increases the risk?  This condition is more likely to develop in:  Females.  Adults age 40 or older.  People who:  Have a family history of flat feet.  Have a history of childhood flexible flatfoot.  Are obese.  Have diabetes.  Have high blood pressure.  Participate in high-impact sports.  Have inflammatory arthritis.  Have a history of broken (fractured) or dislocated bones in the foot.  What are the signs or symptoms?  Symptoms of this condition include:  Pain or tightness along the bottom of the foot.  Foot pain that gets worse with activity.  Swelling of the inner side of the foot.  Swelling of the ankle.  Pain on the outer side of the ankle.  Changes in the way that you walk (gait).  Pronation. This is when the foot and ankle lean inward when you are standing.  Bony bumps on the top or inner side of the foot.  How is this diagnosed?  This condition is diagnosed with a physical exam of your foot and ankle. Your health care provider may also:  Look at your shoes for patterns of wear on the soles.  Order imaging tests, such as X-rays, a CT scan, or an MRI.  Refer you to a health care provider who specializes in feet (podiatrist) or a physical therapist.  How is this treated?  ImageThis condition may be treated with:  Stretching exercises or physical therapy. This helps to increase range of motion and relieve pain.  A shoe insert (orthotic). This helps to support the arch of your foot. Orthotics can be purchased from a store or can be custom-made by your health care provider.  Wearing shoes with appropriate arch support. This is especially important for athletes.  Medicines. These may be prescribed to relieve pain.  An ankle brace, boot, or cast. These may be used to relieve pressure on your foot. You may be given crutches if walking is painful.  Surgery. This may be done to improve the alignment of your foot. This is only needed if your posterior tibial tendon is torn or if you have tarsal coalition.  Follow these instructions at home:  Activity     Do any exercises as told by your health care provider.  If an activity causes pain, avoid it or try to find another activity that does not cause pain.  General instructions     Wear orthotics and appropriate shoes as told by your health care provider.  Take over-the-counter and prescription medicines only as told by your health care provider.  Wear an ankle brace, boot, or cast as told by your health care provider.  Use crutches as told by your health care provider.  Keep all follow-up visits as told by your health care provider. This is important.  How is this prevented?  To prevent the condition from getting worse:  Wear comfortable, supportive shoes that are appropriate for your activities.  Maintain a healthy weight.  Stay active in a way that your health care provider recommends. This will help to keep your feet flexible and strong.  Manage long-term (chronic) health conditions, such as diabetes, high blood pressure, and inflammatory arthritis.  Work with a health care provider if you have concerns about your feet or shoes.  Contact a health care provider if:  You have pain in your foot or lower leg that gets worse or does not improve with medicine.  You have pain or difficulty when walking.  You have problems with your orthotics.  Summary  Flat feet is a common condition in which one or both feet do not have a curve, called an arch, on the inner side.  Your health care provider may recommend a shoe insert (orthotic) or shoes with the appropriate arch support.  Other treatments may include stretching exercises or physical therapy, medicines to relieve pain, and wearing an ankle brace, boot, or cast.  Surgery may be done if you have a tear in the tendon that supports your arch (posterior tibial tendon) or if two or more of your foot bones were joined together (fused) before birth (tarsal coalition).  This information is not intended to replace advice given to you by your health care provider. Make sure you discuss any questions you have with your health care provider.

## 2019-09-28 NOTE — ED PROVIDER NOTE - PATIENT PORTAL LINK FT
You can access the FollowMyHealth Patient Portal offered by [facility] by registering at the following website: [email or web address]. By joining [email or web address]’s FollowMyHealth portal, you will also be able to view your health information using other applications (apps) compatible with our system.

## 2019-09-28 NOTE — ED PROVIDER NOTE - CLINICAL SUMMARY MEDICAL DECISION MAKING FREE TEXT BOX
60yo female with medial R ankle pain x5mths, patient with negative XR. Has flat feet on exam, encouraged arch support and podiatry f/u

## 2019-09-28 NOTE — ED PROVIDER NOTE - NSFOLLOWUPCLINICS_GEN_ALL_ED_FT
[facility]  Podiatry  .  [place]   Phone: [phone]  Fax:   Follow Up Time:

## 2019-09-28 NOTE — ED PROVIDER NOTE - OBJECTIVE STATEMENT
60yo female with PMHx GERD, YOLIE, PE 1yr prior on coumadin presents c/o R medial ankle pain x 5mths, worse with weight bearing and prolonged standing, better with rest, intermittent. Patient has not tried any OTCs. Denies specific injury, direct trauma, or swelling.

## 2019-10-02 ENCOUNTER — APPOINTMENT (OUTPATIENT)
Dept: PODIATRY | Facility: CLINIC | Age: 59
End: 2019-10-02
Payer: MEDICARE

## 2019-10-02 ENCOUNTER — OUTPATIENT (OUTPATIENT)
Dept: OUTPATIENT SERVICES | Facility: HOSPITAL | Age: 59
LOS: 1 days | Discharge: HOME | End: 2019-10-02

## 2019-10-02 ENCOUNTER — OTHER (OUTPATIENT)
Age: 59
End: 2019-10-02

## 2019-10-02 DIAGNOSIS — Z96.659 PRESENCE OF UNSPECIFIED ARTIFICIAL KNEE JOINT: Chronic | ICD-10-CM

## 2019-10-02 DIAGNOSIS — M21.41 FLAT FOOT [PES PLANUS] (ACQUIRED), RIGHT FOOT: ICD-10-CM

## 2019-10-02 DIAGNOSIS — M77.9 ENTHESOPATHY, UNSPECIFIED: ICD-10-CM

## 2019-10-02 PROCEDURE — 99203 OFFICE O/P NEW LOW 30 MIN: CPT

## 2019-10-02 NOTE — HISTORY OF PRESENT ILLNESS
[FreeTextEntry1] : 58 y/o female presents w/  bilateral foot pain. Pain has increased in the last several weeks. Exacerbated by ambulation. No recent trauma (has a fall h/o 1 year ago)

## 2019-10-02 NOTE — PHYSICAL EXAM
[General Appearance - Alert] : alert [General Appearance - In No Acute Distress] : in no acute distress [Oriented To Time, Place, And Person] : oriented to person, place, and time [Impaired Insight] : insight and judgment were intact [FreeTextEntry1] : calluses dorsal aspect of pipj 5th toe b/l

## 2019-10-02 NOTE — ASSESSMENT
[FreeTextEntry1] : -xray reviewed and discussed with patient\par -pt has h/o of GERD. would not tolerate high doses of NSAIDs'\par -referred for u/s to r/o Post. tibial tendon tear\par -gave presicption for ankle gauntlet\par -Pt to see Aaron for fabrication of inserts for PTTD\par -will consider injection on follow up visit.

## 2019-10-08 ENCOUNTER — OUTPATIENT (OUTPATIENT)
Dept: OUTPATIENT SERVICES | Facility: HOSPITAL | Age: 59
LOS: 1 days | Discharge: HOME | End: 2019-10-08
Payer: MEDICARE

## 2019-10-08 DIAGNOSIS — M77.9 ENTHESOPATHY, UNSPECIFIED: ICD-10-CM

## 2019-10-08 DIAGNOSIS — Z96.659 PRESENCE OF UNSPECIFIED ARTIFICIAL KNEE JOINT: Chronic | ICD-10-CM

## 2019-10-08 PROCEDURE — 76882 US LMTD JT/FCL EVL NVASC XTR: CPT | Mod: 26,RT

## 2019-10-16 ENCOUNTER — OUTPATIENT (OUTPATIENT)
Dept: OUTPATIENT SERVICES | Facility: HOSPITAL | Age: 59
LOS: 1 days | Discharge: HOME | End: 2019-10-16

## 2019-10-16 ENCOUNTER — APPOINTMENT (OUTPATIENT)
Dept: PODIATRY | Facility: CLINIC | Age: 59
End: 2019-10-16
Payer: MEDICARE

## 2019-10-16 VITALS
DIASTOLIC BLOOD PRESSURE: 82 MMHG | BODY MASS INDEX: 44.65 KG/M2 | SYSTOLIC BLOOD PRESSURE: 163 MMHG | HEIGHT: 65 IN | WEIGHT: 268 LBS | HEART RATE: 89 BPM

## 2019-10-16 DIAGNOSIS — Z96.659 PRESENCE OF UNSPECIFIED ARTIFICIAL KNEE JOINT: Chronic | ICD-10-CM

## 2019-10-16 PROCEDURE — 99213 OFFICE O/P EST LOW 20 MIN: CPT

## 2019-10-16 NOTE — END OF VISIT
[] : Resident [FreeTextEntry3] : referred for MRI. Discussed results\par continue use brace\par briefly discussed conservative vs surgical management.

## 2019-10-16 NOTE — ASSESSMENT
[FreeTextEntry1] : Hypertrophic PT tendinosis with partial-thickness tear and \par tenosynovitis. Recommend MRI of the right ankle without contrast for \par complete assessment \par \par -xray reviewed and discussed with patient\par -pt has h/o of GERD. would not tolerate high doses of NSAIDs'\par -discussed US findings, showing partial tear right foot\par -cont ankle gauntlet\par -Pt to see Aaron for fabrication of inserts for PTTD\par -will consider injection on follow up visit.\par -order MRI \par -rx voltaren gel topical\par -f/u 2 weeks

## 2019-10-16 NOTE — HISTORY OF PRESENT ILLNESS
[FreeTextEntry1] : 60 y/o female presents w/  bilateral foot pain. Pain has increased in the last several weeks. Exacerbated by ambulation. No recent trauma (has a fall h/o 1 year ago).  Notes relief with ankle brace\par

## 2019-10-22 ENCOUNTER — OUTPATIENT (OUTPATIENT)
Dept: OUTPATIENT SERVICES | Facility: HOSPITAL | Age: 59
LOS: 1 days | Discharge: HOME | End: 2019-10-22

## 2019-10-22 ENCOUNTER — APPOINTMENT (OUTPATIENT)
Dept: PODIATRY | Facility: CLINIC | Age: 59
End: 2019-10-22
Payer: MEDICARE

## 2019-10-22 DIAGNOSIS — Z96.659 PRESENCE OF UNSPECIFIED ARTIFICIAL KNEE JOINT: Chronic | ICD-10-CM

## 2019-10-22 PROCEDURE — 99212 OFFICE O/P EST SF 10 MIN: CPT

## 2019-10-23 ENCOUNTER — OUTPATIENT (OUTPATIENT)
Dept: OUTPATIENT SERVICES | Facility: HOSPITAL | Age: 59
LOS: 1 days | Discharge: HOME | End: 2019-10-23
Payer: MEDICARE

## 2019-10-23 ENCOUNTER — APPOINTMENT (OUTPATIENT)
Dept: PODIATRY | Facility: CLINIC | Age: 59
End: 2019-10-23

## 2019-10-23 DIAGNOSIS — M77.9 ENTHESOPATHY, UNSPECIFIED: ICD-10-CM

## 2019-10-23 DIAGNOSIS — Z96.659 PRESENCE OF UNSPECIFIED ARTIFICIAL KNEE JOINT: Chronic | ICD-10-CM

## 2019-10-23 PROCEDURE — 73721 MRI JNT OF LWR EXTRE W/O DYE: CPT | Mod: 26,RT

## 2019-10-25 NOTE — ASSESSMENT
[FreeTextEntry1] : pt with painful right foot in medial arch. attempted fabrication of orthotic with medial arch support. unable to adequately fit orthotic with ankle support into shoe. recommended a better orthopaedic shoe for pt with that would give more stability.felt applied to inside of sneaker to help maintain medial arch while wearing brace. pt for mri tomorrow. will return here in 2 weeks.

## 2019-11-05 ENCOUNTER — APPOINTMENT (OUTPATIENT)
Dept: PODIATRY | Facility: CLINIC | Age: 59
End: 2019-11-05
Payer: MEDICARE

## 2019-11-05 ENCOUNTER — OUTPATIENT (OUTPATIENT)
Dept: OUTPATIENT SERVICES | Facility: HOSPITAL | Age: 59
LOS: 1 days | Discharge: HOME | End: 2019-11-05

## 2019-11-05 DIAGNOSIS — R60.0 LOCALIZED EDEMA: ICD-10-CM

## 2019-11-05 DIAGNOSIS — Z96.659 PRESENCE OF UNSPECIFIED ARTIFICIAL KNEE JOINT: Chronic | ICD-10-CM

## 2019-11-05 PROCEDURE — 29580 STRAPPING UNNA BOOT: CPT

## 2019-11-05 PROCEDURE — 99212 OFFICE O/P EST SF 10 MIN: CPT | Mod: 25

## 2019-11-08 PROBLEM — R60.0 EDEMA OF EXTREMITY: Status: ACTIVE | Noted: 2019-11-08

## 2019-11-08 NOTE — HISTORY OF PRESENT ILLNESS
[FreeTextEntry1] : 58 y/o female presents w/ bilateral foot pain. Pain has increased in the last several weeks. Exacerbated by ambulation. No recent trauma (has a fall h/o 1 year ago)\par MRI:1. Focal hypertrophic tear of the posterior tibialis with associated  tenosynovitis and surrounding inflammation involving approximately 3.5 cm of tendon length. \par 2. Sprain of the tibiospring and superomedial fibers of the spring ligament. \par 3. 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 \par 4. Fatty atrophy of the abductor digit minimi consistent with neuropathy. \par 5. Strain of the abductor hallucis muscle. \par 6. Additional degenerative change as above.

## 2019-11-08 NOTE — END OF VISIT
[] : Resident [FreeTextEntry3] : MRi reviewed and extensively discussed with patient. \par conservative care with serial immobilization with unna boots \par Patient given a script for a CAM boot

## 2019-11-08 NOTE — ASSESSMENT
[FreeTextEntry1] : nails clipped, callosities trimmed. pt put into unna boot to help stabilize ankle,decrease pain. pt given script for cam walker. will return next week.

## 2019-11-08 NOTE — PHYSICAL EXAM
[General Appearance - Alert] : alert [General Appearance - In No Acute Distress] : in no acute distress [FreeTextEntry1] : calluses dorsal aspect of pipj 5th toe b/l

## 2019-11-12 ENCOUNTER — APPOINTMENT (OUTPATIENT)
Dept: PODIATRY | Facility: CLINIC | Age: 59
End: 2019-11-12
Payer: MEDICARE

## 2019-11-12 ENCOUNTER — OUTPATIENT (OUTPATIENT)
Dept: OUTPATIENT SERVICES | Facility: HOSPITAL | Age: 59
LOS: 1 days | Discharge: HOME | End: 2019-11-12

## 2019-11-12 DIAGNOSIS — R60.0 LOCALIZED EDEMA: ICD-10-CM

## 2019-11-12 DIAGNOSIS — Z96.659 PRESENCE OF UNSPECIFIED ARTIFICIAL KNEE JOINT: Chronic | ICD-10-CM

## 2019-11-12 PROCEDURE — 29580 STRAPPING UNNA BOOT: CPT | Mod: 59

## 2019-11-12 PROCEDURE — 97763 ORTHC/PROSTC MGMT SBSQ ENC: CPT

## 2019-11-14 PROBLEM — R60.0 EDEMA OF RIGHT FOOT: Status: ACTIVE | Noted: 2019-11-08

## 2019-11-14 NOTE — END OF VISIT
[FreeTextEntry2] : continue with unna boot and arch supports.\par possible custom AFO at a later date

## 2019-11-14 NOTE — ASSESSMENT
[FreeTextEntry1] : pt continues with right medal ankle pain. c/o itchiness with unna boot. applied hydrocortisone and webril under unna boot today to prevent above from ocurring. orthotic fabricated to support medial arch placed in shot leg brace. pt will return next week.

## 2019-11-14 NOTE — HISTORY OF PRESENT ILLNESS
[FreeTextEntry1] : 60 y/o female presents w/ bilateral foot pain. Pain has increased in the last several weeks. Exacerbated by ambulation. No recent trauma (has a fall h/o 1 year ago)\par MRI:1. Focal hypertrophic tear of the posterior tibialis with associated  tenosynovitis and surrounding inflammation involving approximately 3.5 cm of tendon length. \par 2. Sprain of the tibiospring and superomedial fibers of the spring ligament. \par 3. 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 \par 4. Fatty atrophy of the abductor digit minimi consistent with neuropathy. \par 5. Strain of the abductor hallucis muscle. \par 6. Additional degenerative change as above.

## 2019-11-18 ENCOUNTER — APPOINTMENT (OUTPATIENT)
Dept: PODIATRY | Facility: CLINIC | Age: 59
End: 2019-11-18
Payer: MEDICARE

## 2019-11-18 ENCOUNTER — OUTPATIENT (OUTPATIENT)
Dept: OUTPATIENT SERVICES | Facility: HOSPITAL | Age: 59
LOS: 1 days | Discharge: HOME | End: 2019-11-18

## 2019-11-18 DIAGNOSIS — M21.41 FLAT FOOT [PES PLANUS] (ACQUIRED), RIGHT FOOT: ICD-10-CM

## 2019-11-18 DIAGNOSIS — M21.42 FLAT FOOT [PES PLANUS] (ACQUIRED), RIGHT FOOT: ICD-10-CM

## 2019-11-18 DIAGNOSIS — Z96.659 PRESENCE OF UNSPECIFIED ARTIFICIAL KNEE JOINT: Chronic | ICD-10-CM

## 2019-11-18 PROCEDURE — 20551 NJX 1 TENDON ORIGIN/INSJ: CPT | Mod: RT

## 2019-11-18 NOTE — ASSESSMENT
[FreeTextEntry1] : pt not improving with conservative care\par + tinne's sign--> - peritenious injection performed to the jemma pedis with 1% lidocaine and 3mg of Celestone for a total of 1.5cc. Injection performed under sterile technique. Indicated for symptomatic relief \par -pt may need surgical intervention\par -will discuss further on follow up visit \par -return one week. continue wearing CAM boot \par

## 2019-11-18 NOTE — PHYSICAL EXAM
[FreeTextEntry1] : TTP of the right posterior tibial tendon. collapse of medial longitudinal arch b/l, positive Tinnels sign

## 2019-11-19 DIAGNOSIS — M66.871 SPONTANEOUS RUPTURE OF OTHER TENDONS, RIGHT ANKLE AND FOOT: ICD-10-CM

## 2019-11-19 DIAGNOSIS — R60.0 LOCALIZED EDEMA: ICD-10-CM

## 2019-11-19 DIAGNOSIS — M77.9 ENTHESOPATHY, UNSPECIFIED: ICD-10-CM

## 2019-11-19 DIAGNOSIS — E11.9 TYPE 2 DIABETES MELLITUS WITHOUT COMPLICATIONS: ICD-10-CM

## 2019-11-20 DIAGNOSIS — M21.41 FLAT FOOT [PES PLANUS] (ACQUIRED), RIGHT FOOT: ICD-10-CM

## 2019-11-20 DIAGNOSIS — R60.0 LOCALIZED EDEMA: ICD-10-CM

## 2019-11-20 DIAGNOSIS — M77.9 ENTHESOPATHY, UNSPECIFIED: ICD-10-CM

## 2019-11-20 DIAGNOSIS — M66.871 SPONTANEOUS RUPTURE OF OTHER TENDONS, RIGHT ANKLE AND FOOT: ICD-10-CM

## 2019-11-25 ENCOUNTER — OUTPATIENT (OUTPATIENT)
Dept: OUTPATIENT SERVICES | Facility: HOSPITAL | Age: 59
LOS: 1 days | Discharge: HOME | End: 2019-11-25

## 2019-11-25 ENCOUNTER — APPOINTMENT (OUTPATIENT)
Dept: PODIATRY | Facility: CLINIC | Age: 59
End: 2019-11-25
Payer: MEDICARE

## 2019-11-25 VITALS
HEART RATE: 93 BPM | BODY MASS INDEX: 43.32 KG/M2 | HEIGHT: 65 IN | SYSTOLIC BLOOD PRESSURE: 145 MMHG | DIASTOLIC BLOOD PRESSURE: 81 MMHG | WEIGHT: 260 LBS

## 2019-11-25 DIAGNOSIS — M66.871 SPONTANEOUS RUPTURE OF OTHER TENDONS, RIGHT ANKLE AND FOOT: ICD-10-CM

## 2019-11-25 DIAGNOSIS — Z96.659 PRESENCE OF UNSPECIFIED ARTIFICIAL KNEE JOINT: Chronic | ICD-10-CM

## 2019-11-25 DIAGNOSIS — G57.51 TARSAL TUNNEL SYNDROME, RIGHT LOWER LIMB: ICD-10-CM

## 2019-11-25 DIAGNOSIS — M77.9 ENTHESOPATHY, UNSPECIFIED: ICD-10-CM

## 2019-11-25 PROCEDURE — 99212 OFFICE O/P EST SF 10 MIN: CPT

## 2019-11-26 PROBLEM — M77.9 TENDONITIS: Status: ACTIVE | Noted: 2019-10-02

## 2019-11-26 PROBLEM — M66.871 NONTRAUMATIC RUPTURE OF RIGHT POSTERIOR TIBIAL TENDON: Status: ACTIVE | Noted: 2019-10-16

## 2019-11-26 PROBLEM — G57.51 TARSAL TUNNEL SYNDROME OF RIGHT SIDE: Status: ACTIVE | Noted: 2019-11-18

## 2019-11-26 NOTE — ASSESSMENT
[FreeTextEntry1] : patient has failed conservative care with immobilization (ankle gauntlet, unna boots, cam boot, supportive inserts, injections)\par pt may benefit from surgical intervention\par pt referred to Dr. John Nicole for surgical eval. (case d/w Dr. Nicole prior to referral)

## 2019-11-26 NOTE — HISTORY OF PRESENT ILLNESS
[FreeTextEntry1] : 60 y/o female presents w/  bilateral foot pain. Pain has increased in the last several weeks. Exacerbated by ambulation. No recent trauma (has a fall h/o 1 year ago).  Notes relief with ankle brace\par \par MRI:1. Focal hypertrophic tear of the posterior tibialis with associated tenosynovitis and surrounding inflammation involving approximately 3.5 cm of tendon length. \par 2. Sprain of the tibiospring and superomedial fibers of the spring ligament. \par 3. 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 \par 4. Fatty atrophy of the abductor digit minimi consistent with neuropathy. \par 5. Strain of the abductor hallucis muscle. \par 6. Additional degenerative change as above. \par

## 2019-12-02 DIAGNOSIS — M77.9 ENTHESOPATHY, UNSPECIFIED: ICD-10-CM

## 2019-12-02 DIAGNOSIS — M21.41 FLAT FOOT [PES PLANUS] (ACQUIRED), RIGHT FOOT: ICD-10-CM

## 2019-12-02 DIAGNOSIS — G57.51 TARSAL TUNNEL SYNDROME, RIGHT LOWER LIMB: ICD-10-CM

## 2019-12-02 DIAGNOSIS — M66.871 SPONTANEOUS RUPTURE OF OTHER TENDONS, RIGHT ANKLE AND FOOT: ICD-10-CM

## 2020-08-05 NOTE — PHYSICAL EXAM
Problem: Depressed Mood (Adult/Pediatric)  Goal: *STG: Verbalizes anger, guilt, and other feelings in a constructive manor  Outcome: Progressing Towards Goal  Note: Verbalizes feelings constructively  Goal: *STG: Remains safe in hospital  Outcome: Progressing Towards Goal  Note: Arrived on unit and is safe.   Goal: Interventions  Outcome: Progressing Towards Goal [General Appearance - Alert] : alert [General Appearance - In No Acute Distress] : in no acute distress [Oriented To Time, Place, And Person] : oriented to person, place, and time [Impaired Insight] : insight and judgment were intact [FreeTextEntry1] : calluses dorsal aspect of pipj 5th toe b/l

## 2020-11-13 ENCOUNTER — APPOINTMENT (OUTPATIENT)
Dept: SURGERY | Facility: CLINIC | Age: 60
End: 2020-11-13
Payer: MEDICARE

## 2020-11-13 VITALS
BODY MASS INDEX: 50.92 KG/M2 | HEART RATE: 98 BPM | WEIGHT: 291 LBS | OXYGEN SATURATION: 99 % | SYSTOLIC BLOOD PRESSURE: 152 MMHG | HEIGHT: 63.5 IN | DIASTOLIC BLOOD PRESSURE: 86 MMHG

## 2020-11-13 PROCEDURE — 99072 ADDL SUPL MATRL&STAF TM PHE: CPT

## 2020-11-13 PROCEDURE — 99204 OFFICE O/P NEW MOD 45 MIN: CPT

## 2020-11-13 RX ORDER — BUDESONIDE AND FORMOTEROL FUMARATE DIHYDRATE 160; 4.5 UG/1; UG/1
160-4.5 AEROSOL RESPIRATORY (INHALATION)
Refills: 0 | Status: DISCONTINUED | COMMUNITY
End: 2020-11-13

## 2020-11-13 RX ORDER — FUROSEMIDE 20 MG/1
20 TABLET ORAL
Refills: 0 | Status: DISCONTINUED | COMMUNITY
Start: 2018-10-11 | End: 2020-11-13

## 2020-11-13 RX ORDER — RAMIPRIL 5 MG/1
CAPSULE ORAL
Refills: 0 | Status: DISCONTINUED | COMMUNITY
End: 2020-11-13

## 2020-11-13 RX ORDER — METHYLPREDNISOLONE 4 MG/1
4 TABLET ORAL
Qty: 1 | Refills: 0 | Status: DISCONTINUED | COMMUNITY
Start: 2019-11-05 | End: 2020-11-13

## 2020-11-13 RX ORDER — DICLOFENAC SODIUM 10 MG/G
1 GEL TOPICAL DAILY
Qty: 1 | Refills: 3 | Status: DISCONTINUED | COMMUNITY
Start: 2019-10-16 | End: 2020-11-13

## 2020-11-13 RX ORDER — WARFARIN SODIUM 6 MG/1
TABLET ORAL
Refills: 0 | Status: DISCONTINUED | COMMUNITY
End: 2020-11-13

## 2020-11-13 NOTE — PLAN
[FreeTextEntry1] : Ms. Hoang needs the following tests/clearances:\par Bloodwork (pt had recent bw, will obtain results and order missing values)\par Liver U/S\par Cardiology, pulmonology, hematology, psychological clearances\par EGD\par Venous duplex\par

## 2020-11-13 NOTE — HISTORY OF PRESENT ILLNESS
[de-identified] : Ms. Hoang had gastric banding with subsequent band removal in the 1990's.  She has always struggled with her weight.  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  She is here to discuss surgical options for weight loss, as she knows she must lose weight to feel bertter.

## 2020-11-13 NOTE — REVIEW OF SYSTEMS
[Lower Ext Edema] : lower extremity edema [Shortness Of Breath] : shortness of breath [Reflux/Heartburn] : reflux/heartburn [Joint Pain] : joint pain [Negative] : Psychiatric [de-identified] : frequent urticaria [FreeTextEntry1] : frequent urticaria unknown reason

## 2020-11-18 ENCOUNTER — APPOINTMENT (OUTPATIENT)
Dept: SURGERY | Facility: CLINIC | Age: 60
End: 2020-11-18
Payer: SELF-PAY

## 2020-11-18 PROCEDURE — SI006: CPT | Mod: NC

## 2020-11-19 ENCOUNTER — OUTPATIENT (OUTPATIENT)
Dept: OUTPATIENT SERVICES | Facility: HOSPITAL | Age: 60
LOS: 1 days | Discharge: HOME | End: 2020-11-19
Payer: MEDICARE

## 2020-11-19 DIAGNOSIS — Z96.659 PRESENCE OF UNSPECIFIED ARTIFICIAL KNEE JOINT: Chronic | ICD-10-CM

## 2020-11-19 DIAGNOSIS — E66.01 MORBID (SEVERE) OBESITY DUE TO EXCESS CALORIES: ICD-10-CM

## 2020-11-19 PROCEDURE — 76700 US EXAM ABDOM COMPLETE: CPT | Mod: 26

## 2020-11-23 ENCOUNTER — NON-APPOINTMENT (OUTPATIENT)
Age: 60
End: 2020-11-23

## 2020-12-01 ENCOUNTER — APPOINTMENT (OUTPATIENT)
Dept: CARDIOLOGY | Facility: CLINIC | Age: 60
End: 2020-12-01
Payer: MEDICARE

## 2020-12-01 VITALS
TEMPERATURE: 97.1 F | WEIGHT: 293 LBS | HEART RATE: 85 BPM | SYSTOLIC BLOOD PRESSURE: 110 MMHG | BODY MASS INDEX: 50.02 KG/M2 | DIASTOLIC BLOOD PRESSURE: 70 MMHG | HEIGHT: 64.32 IN

## 2020-12-01 PROCEDURE — 99072 ADDL SUPL MATRL&STAF TM PHE: CPT

## 2020-12-01 PROCEDURE — 93000 ELECTROCARDIOGRAM COMPLETE: CPT

## 2020-12-01 PROCEDURE — 99205 OFFICE O/P NEW HI 60 MIN: CPT

## 2020-12-07 ENCOUNTER — OUTPATIENT (OUTPATIENT)
Dept: OUTPATIENT SERVICES | Facility: HOSPITAL | Age: 60
LOS: 1 days | Discharge: HOME | End: 2020-12-07

## 2020-12-07 ENCOUNTER — NON-APPOINTMENT (OUTPATIENT)
Age: 60
End: 2020-12-07

## 2020-12-07 DIAGNOSIS — Z96.659 PRESENCE OF UNSPECIFIED ARTIFICIAL KNEE JOINT: Chronic | ICD-10-CM

## 2020-12-07 DIAGNOSIS — F50.9 EATING DISORDER, UNSPECIFIED: ICD-10-CM

## 2020-12-07 DIAGNOSIS — E55.9 VITAMIN D DEFICIENCY, UNSPECIFIED: ICD-10-CM

## 2020-12-07 LAB
25(OH)D3 SERPL-MCNC: 16 NG/ML
CALCIUM SERPL-MCNC: 9.3 MG/DL
CHOLEST SERPL-MCNC: 208 MG/DL
FERRITIN SERPL-MCNC: 85 NG/ML
FOLATE RBC-MCNC: 1046 NG/ML
HCT VFR BLD CALC: 41.7 %
HDLC SERPL-MCNC: 56 MG/DL
IRON SATN MFR SERPL: 17 %
IRON SERPL-MCNC: 51 UG/DL
LDLC SERPL CALC-MCNC: 137 MG/DL
NONHDLC SERPL-MCNC: 152 MG/DL
PARATHYROID HORMONE INTACT: 49 PG/ML
TIBC SERPL-MCNC: 292 UG/DL
TRIGL SERPL-MCNC: 82 MG/DL
UIBC SERPL-MCNC: 241 UG/DL
VIT B1 SERPL-MCNC: 113.5 NMOL/L
VIT B12 SERPL-MCNC: 427 PG/ML

## 2020-12-07 RX ORDER — ERGOCALCIFEROL 1.25 MG/1
1.25 MG CAPSULE, LIQUID FILLED ORAL
Qty: 4 | Refills: 3 | Status: ACTIVE | COMMUNITY
Start: 2020-12-07 | End: 1900-01-01

## 2020-12-08 DIAGNOSIS — F50.9 EATING DISORDER, UNSPECIFIED: ICD-10-CM

## 2020-12-08 LAB
ALBUMIN SERPL ELPH-MCNC: 4 G/DL
ALP BLD-CCNC: 60 U/L
ALT SERPL-CCNC: 9 U/L
ANION GAP SERPL CALC-SCNC: 10 MMOL/L
AST SERPL-CCNC: 15 U/L
BASOPHILS # BLD AUTO: 0.01 K/UL
BASOPHILS NFR BLD AUTO: 0.2 %
BILIRUB SERPL-MCNC: 0.7 MG/DL
BUN SERPL-MCNC: 9 MG/DL
CALCIUM SERPL-MCNC: 9.4 MG/DL
CHLORIDE SERPL-SCNC: 106 MMOL/L
CHOLEST SERPL-MCNC: 199 MG/DL
CO2 SERPL-SCNC: 28 MMOL/L
CREAT SERPL-MCNC: 0.9 MG/DL
EOSINOPHIL # BLD AUTO: 0.27 K/UL
EOSINOPHIL NFR BLD AUTO: 4.9 %
ESTIMATED AVERAGE GLUCOSE: 143 MG/DL
GLUCOSE SERPL-MCNC: 99 MG/DL
HBA1C MFR BLD HPLC: 6.6 %
HCT VFR BLD CALC: 39.2 %
HDLC SERPL-MCNC: 55 MG/DL
HGB BLD-MCNC: 12.2 G/DL
IMM GRANULOCYTES NFR BLD AUTO: 0.4 %
LDLC SERPL CALC-MCNC: 138 MG/DL
LYMPHOCYTES # BLD AUTO: 1.6 K/UL
LYMPHOCYTES NFR BLD AUTO: 29.1 %
MAN DIFF?: NORMAL
MCHC RBC-ENTMCNC: 26.8 PG
MCHC RBC-ENTMCNC: 31.1 G/DL
MCV RBC AUTO: 86 FL
MONOCYTES # BLD AUTO: 0.33 K/UL
MONOCYTES NFR BLD AUTO: 6 %
NEUTROPHILS # BLD AUTO: 3.26 K/UL
NEUTROPHILS NFR BLD AUTO: 59.4 %
NONHDLC SERPL-MCNC: 144 MG/DL
PLATELET # BLD AUTO: 231 K/UL
POTASSIUM SERPL-SCNC: 4.7 MMOL/L
PROT SERPL-MCNC: 6.9 G/DL
RBC # BLD: 4.56 M/UL
RBC # FLD: 14.9 %
SODIUM SERPL-SCNC: 144 MMOL/L
TRIGL SERPL-MCNC: 81 MG/DL
TSH SERPL-ACNC: 1.47 UIU/ML
WBC # FLD AUTO: 5.49 K/UL

## 2020-12-09 ENCOUNTER — APPOINTMENT (OUTPATIENT)
Dept: CARDIOLOGY | Facility: CLINIC | Age: 60
End: 2020-12-09
Payer: MEDICARE

## 2020-12-09 PROCEDURE — 93306 TTE W/DOPPLER COMPLETE: CPT

## 2020-12-09 PROCEDURE — 99072 ADDL SUPL MATRL&STAF TM PHE: CPT

## 2020-12-10 ENCOUNTER — APPOINTMENT (OUTPATIENT)
Dept: SURGERY | Facility: CLINIC | Age: 60
End: 2020-12-10
Payer: MEDICARE

## 2020-12-10 VITALS — HEIGHT: 63.5 IN | BODY MASS INDEX: 51.27 KG/M2 | WEIGHT: 293 LBS

## 2020-12-10 PROCEDURE — 99212 OFFICE O/P EST SF 10 MIN: CPT

## 2021-01-01 NOTE — ED PROVIDER NOTE - ENMT, MLM
Discharge Summary - [facility]   Vanessa Srinivasan 2 days male MRN: [id number]  Unit/Bed#: (N) Encounter: [id number]    Admission Date and Time: 2021  5:23 AM   Discharge Date: 2021  Admitting Diagnosis: Single liveborn infant, delivered vaginally [Z38 00]  Discharge Diagnosis: Normal     HPI: Vanessa Srinivasan is a 3075 g (6 lb 12 5 oz) male born to a 34 y o   G 1 P 1 mother at Gestational Age: 38w11d  Discharge Weight:  Weight: 2955 g (6 lb 8 2 oz)   Route of delivery: Vaginal, Spontaneous  Procedures Performed:   Orders Placed This Encounter   Procedures    Circumcision baby     Hospital Course: Vanessa Srinivasan was born via  without  Breastfeeding established with formula supplementation  Voiding and stooling adequately  3 9% weight loss since birth  Bilirubin 7 09 @ 32 HOL - low intermediate risk   + B/L Ortalani on exam   Referral made to Peds Ortho  Will follow up with Dr Miguel Bee      Highlights of Hospital Stay:   Hearing screen:  Hearing Screen  Risk factors: No risk factors present  Parents informed: Yes  Initial ALIE screening results  Initial Hearing Screen Results Left Ear: Refer  Initial Hearing Screen Results Right Ear: Refer  Hearing Screen Date: 21  Re-Screen ALIE screening results  Hearing rescreen results left ear: Refer  Hearing rescreen results right ear: Refer  Hearing Rescreen Date: 21    Hepatitis B vaccination:   Immunization History   Administered Date(s) Administered    Hep B, Adolescent or Pediatric 2021     Feedings (last 2 days)     Date/Time   Feeding Type   Feeding Route    21 2045   Non-human milk substitute   --    21 0645   Non-human milk substitute   --    21 0330   Non-human milk substitute   --    21 0130   Non-human milk substitute   --    21 2100   Non-human milk substitute   --    21 2055   Non-human milk substitute   --    21 1801   Non-human milk substitute   Other (Comment)    Feeding Route: syringe/fingerfeed at 21 1801            SAT after 24 hours: Pulse Ox Screen: Initial  Preductal Sensor %: 98 %  Preductal Sensor Site: R Upper Extremity  Postductal Sensor % : 98 %  Postductal Sensor Site: R Lower Extremity  CCHD Negative Screen: Pass - No Further Intervention Needed    Mother's blood type: @lastlabneo(ABO,RH,ANTIBODYSCR)@   Baby's blood type:   ABO Grouping   Date Value Ref Range Status   2021 O  Final     Rh Factor   Date Value Ref Range Status   2021 Positive  Final     Vipul: No results found for: ANTIBODYSCR  Bilirubin: No results found for: BILITOT   Metabolic Screen Date:  (21 0529 : Eden Aguirre RN)     Physical Exam:  General Appearance:  Alert, active, no distress  Head:  Normocephalic, AFOF                             Eyes:  Conjunctiva clear, +RR  Ears:  Normally placed, no anomalies  Nose: nares patent                           Mouth:  Palate intact, + mild ankyloglossia  Respiratory:  No grunting, flaring, retractions, breath sounds clear and equal    Cardiovascular:  Regular rate and rhythm  No murmur  Adequate perfusion/capillary refill  Femoral pulses present   Abdomen:   Soft, non-distended, no masses, bowel sounds present, no HSM  Genitourinary:  Normal genitalia, Healing circumcision  Spine:  No hair jadyn, dimples  Musculoskeletal:  + Ortalani bilaterally  Skin/Hair/Nails:   Skin warm, dry, and intact, no rashes               Neurologic:   Normal tone and reflexes    Discharge instructions/Information to patient and family:   See after visit summary for information provided to patient and family  Provisions for Follow-Up Care:  See after visit summary for information related to follow-up care and any pertinent home health orders  Disposition: Home    Discharge Medications:  See after visit summary for reconciled discharge medications provided to patient and family  Airway patent, Nasal mucosa clear. Mouth with normal mucosa. Throat has no vesicles, no oropharyngeal exudates and uvula is midline. (+) small white papules noted soft palate, no bleeding at the base, nontender. TM's clear bilaterally.

## 2021-01-04 ENCOUNTER — APPOINTMENT (OUTPATIENT)
Dept: CARDIOLOGY | Facility: CLINIC | Age: 61
End: 2021-01-04
Payer: MEDICARE

## 2021-01-04 VITALS
HEIGHT: 63.5 IN | WEIGHT: 290 LBS | BODY MASS INDEX: 50.75 KG/M2 | DIASTOLIC BLOOD PRESSURE: 70 MMHG | TEMPERATURE: 96.9 F | SYSTOLIC BLOOD PRESSURE: 120 MMHG

## 2021-01-04 DIAGNOSIS — I50.20 UNSPECIFIED SYSTOLIC (CONGESTIVE) HEART FAILURE: ICD-10-CM

## 2021-01-04 PROCEDURE — 99215 OFFICE O/P EST HI 40 MIN: CPT

## 2021-01-04 PROCEDURE — 99072 ADDL SUPL MATRL&STAF TM PHE: CPT

## 2021-01-04 RX ORDER — FLUCONAZOLE 150 MG/1
150 TABLET ORAL
Qty: 1 | Refills: 0 | Status: DISCONTINUED | COMMUNITY
Start: 2020-09-16

## 2021-01-04 RX ORDER — SULFAMETHOXAZOLE AND TRIMETHOPRIM 800; 160 MG/1; MG/1
800-160 TABLET ORAL
Qty: 14 | Refills: 0 | Status: DISCONTINUED | COMMUNITY
Start: 2020-10-08

## 2021-01-04 RX ORDER — BUDESONIDE AND FORMOTEROL FUMARATE DIHYDRATE 160; 4.5 UG/1; UG/1
160-4.5 AEROSOL RESPIRATORY (INHALATION)
Refills: 0 | Status: ACTIVE | COMMUNITY

## 2021-01-04 RX ORDER — METOPROLOL SUCCINATE 25 MG/1
25 TABLET, EXTENDED RELEASE ORAL DAILY
Qty: 30 | Refills: 3 | Status: ACTIVE | COMMUNITY
Start: 2021-01-04 | End: 1900-01-01

## 2021-01-13 ENCOUNTER — APPOINTMENT (OUTPATIENT)
Dept: VASCULAR SURGERY | Facility: CLINIC | Age: 61
End: 2021-01-13
Payer: MEDICARE

## 2021-01-13 VITALS
DIASTOLIC BLOOD PRESSURE: 78 MMHG | SYSTOLIC BLOOD PRESSURE: 128 MMHG | BODY MASS INDEX: 51.26 KG/M2 | WEIGHT: 293 LBS | TEMPERATURE: 97 F

## 2021-01-13 DIAGNOSIS — M79.89 OTHER SPECIFIED SOFT TISSUE DISORDERS: ICD-10-CM

## 2021-01-13 PROCEDURE — 99203 OFFICE O/P NEW LOW 30 MIN: CPT

## 2021-01-13 PROCEDURE — 93970 EXTREMITY STUDY: CPT

## 2021-01-13 PROCEDURE — 99072 ADDL SUPL MATRL&STAF TM PHE: CPT

## 2021-01-13 NOTE — HISTORY OF PRESENT ILLNESS
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 The patient was seen by hematology and has a history of protein  S. deficiency and is currently on lifelong anticoagulation. She informs me she's preop for bariatric surgery and presents here for preoperative clearance.\par \par \par Due to COVID-19, pre-visit patient instructions were explained to the patient and their symptoms were checked upon arrival. Masks were used by the healthcare provider and staff and the examination room was cleaned after the patient visit was concluded.\par

## 2021-01-13 NOTE — PHYSICAL EXAM
[2+] : left 2+ [Ankle Swelling (On Exam)] : present [Ankle Swelling Bilaterally] : bilaterally  [Ankle Swelling On The Left] : moderate [Varicose Veins Of Lower Extremities] : not present [Abdomen Masses] : No abdominal masses

## 2021-01-13 NOTE — ASSESSMENT
[FreeTextEntry1] : The patient is a 60-year-old female with a history of DVT and coronary embolism 6 years ago. The patient is currently on lifelong anticoagulation secondary to a protein S deficiency. I performed a venous duplex in my office that showed no evidence of acute or chronic deep vein thrombosis. From my standpoint I see no contraindication to bariatric surgery. Her anticoagulation recommendation should be as per hematology

## 2021-01-14 ENCOUNTER — APPOINTMENT (OUTPATIENT)
Dept: SURGERY | Facility: CLINIC | Age: 61
End: 2021-01-14
Payer: MEDICARE

## 2021-01-14 VITALS — WEIGHT: 290 LBS | HEIGHT: 63.5 IN | BODY MASS INDEX: 50.75 KG/M2

## 2021-01-14 PROCEDURE — ZZZZZ: CPT

## 2021-01-15 ENCOUNTER — APPOINTMENT (OUTPATIENT)
Dept: SURGERY | Facility: CLINIC | Age: 61
End: 2021-01-15
Payer: MEDICARE

## 2021-01-15 PROCEDURE — G2012 BRIEF CHECK IN BY MD/QHP: CPT

## 2021-01-25 ENCOUNTER — APPOINTMENT (OUTPATIENT)
Dept: CARDIOLOGY | Facility: CLINIC | Age: 61
End: 2021-01-25
Payer: MEDICARE

## 2021-01-25 VITALS
SYSTOLIC BLOOD PRESSURE: 120 MMHG | HEIGHT: 63.5 IN | DIASTOLIC BLOOD PRESSURE: 80 MMHG | WEIGHT: 281 LBS | TEMPERATURE: 97.5 F | BODY MASS INDEX: 49.17 KG/M2

## 2021-01-25 PROCEDURE — 99072 ADDL SUPL MATRL&STAF TM PHE: CPT

## 2021-01-25 PROCEDURE — 99213 OFFICE O/P EST LOW 20 MIN: CPT

## 2021-01-25 RX ORDER — METFORMIN HYDROCHLORIDE 500 MG/1
500 TABLET, COATED ORAL DAILY
Refills: 0 | Status: ACTIVE | COMMUNITY

## 2021-02-18 ENCOUNTER — APPOINTMENT (OUTPATIENT)
Dept: SURGERY | Facility: CLINIC | Age: 61
End: 2021-02-18

## 2021-03-10 ENCOUNTER — NON-APPOINTMENT (OUTPATIENT)
Age: 61
End: 2021-03-10

## 2021-03-10 LAB — 25(OH)D3 SERPL-MCNC: 22 NG/ML

## 2021-03-18 ENCOUNTER — APPOINTMENT (OUTPATIENT)
Dept: SURGERY | Facility: CLINIC | Age: 61
End: 2021-03-18
Payer: MEDICARE

## 2021-03-18 VITALS — HEIGHT: 63.5 IN | BODY MASS INDEX: 49.35 KG/M2 | WEIGHT: 282 LBS

## 2021-03-18 PROCEDURE — ZZZZZ: CPT

## 2021-04-26 ENCOUNTER — APPOINTMENT (OUTPATIENT)
Dept: CARDIOLOGY | Facility: CLINIC | Age: 61
End: 2021-04-26

## 2021-04-26 ENCOUNTER — APPOINTMENT (OUTPATIENT)
Dept: SURGERY | Facility: CLINIC | Age: 61
End: 2021-04-26

## 2021-04-29 ENCOUNTER — APPOINTMENT (OUTPATIENT)
Dept: SURGERY | Facility: CLINIC | Age: 61
End: 2021-04-29
Payer: MEDICARE

## 2021-04-29 ENCOUNTER — NON-APPOINTMENT (OUTPATIENT)
Age: 61
End: 2021-04-29

## 2021-04-29 VITALS — WEIGHT: 291 LBS | BODY MASS INDEX: 50.92 KG/M2 | HEIGHT: 63.5 IN

## 2021-04-29 PROCEDURE — ZZZZZ: CPT

## 2021-06-07 ENCOUNTER — OUTPATIENT (OUTPATIENT)
Dept: OUTPATIENT SERVICES | Facility: HOSPITAL | Age: 61
LOS: 1 days | Discharge: HOME | End: 2021-06-07
Payer: MEDICARE

## 2021-06-07 DIAGNOSIS — Z96.659 PRESENCE OF UNSPECIFIED ARTIFICIAL KNEE JOINT: Chronic | ICD-10-CM

## 2021-06-07 DIAGNOSIS — E66.09 OTHER OBESITY DUE TO EXCESS CALORIES: ICD-10-CM

## 2021-06-07 PROCEDURE — 76700 US EXAM ABDOM COMPLETE: CPT | Mod: 26

## 2021-07-15 ENCOUNTER — NON-APPOINTMENT (OUTPATIENT)
Age: 61
End: 2021-07-15

## 2021-07-20 ENCOUNTER — APPOINTMENT (OUTPATIENT)
Dept: UROLOGY | Facility: CLINIC | Age: 61
End: 2021-07-20
Payer: MEDICARE

## 2021-07-20 VITALS — BODY MASS INDEX: 47.48 KG/M2 | WEIGHT: 285 LBS | HEIGHT: 65 IN

## 2021-07-20 DIAGNOSIS — N23 UNSPECIFIED RENAL COLIC: ICD-10-CM

## 2021-07-20 PROCEDURE — 99204 OFFICE O/P NEW MOD 45 MIN: CPT

## 2021-07-20 NOTE — HISTORY OF PRESENT ILLNESS
[FreeTextEntry1] : Patient with persistent microscopic hematuria which has worsened as of late according to patient. She quit smoking at age 19. She was on Xarelto for history of pulmonary embolism which is being held currently by her primary. She does have some frequency, urgency, occasional dysuria. She also has right flank pain. No fever. She states a history of kidney stones

## 2021-07-20 NOTE — REVIEW OF SYSTEMS
[Fever] : no fever [Sore Throat] : no sore throat [Chest Pain] : no chest pain [Shortness Of Breath] : no shortness of breath [Cough] : no cough [Constipation] : no constipation [Diarrhea] : no diarrhea [Confused] : no confusion

## 2021-07-20 NOTE — PHYSICAL EXAM
[General Appearance - In No Acute Distress] : no acute distress [] : no respiratory distress [FreeTextEntry1] : Mild right CVA tenderness. No left-sided tenderness [Normal Station and Gait] : the gait and station were normal for the patient's age [Oriented To Time, Place, And Person] : oriented to person, place, and time

## 2021-07-20 NOTE — ASSESSMENT
[FreeTextEntry1] : Microscopic hematuria, right flank pain and lower urinary tract symptoms. We'll send urine cytology, creatinine and CT urography. Risks benefits and alternatives fully discussed including possible anaphylactoid reaction from IV contrast. Return to office for flexible cystoscopy [Urinary Symptom or Sign (788.99\R39.89)] : implantation

## 2021-07-21 LAB — URINE CYTOLOGY: NORMAL

## 2021-07-23 LAB — CREAT SERPL-MCNC: 0.7 MG/DL

## 2021-08-05 ENCOUNTER — NON-APPOINTMENT (OUTPATIENT)
Age: 61
End: 2021-08-05

## 2021-08-24 ENCOUNTER — APPOINTMENT (OUTPATIENT)
Dept: UROLOGY | Facility: CLINIC | Age: 61
End: 2021-08-24
Payer: MEDICARE

## 2021-08-24 PROCEDURE — 52000 CYSTOURETHROSCOPY: CPT

## 2021-09-07 LAB
ANION GAP SERPL CALC-SCNC: 10 MMOL/L
BUN SERPL-MCNC: 8 MG/DL
CALCIUM SERPL-MCNC: 9.7 MG/DL
CHLORIDE SERPL-SCNC: 105 MMOL/L
CO2 SERPL-SCNC: 30 MMOL/L
CREAT SERPL-MCNC: 0.8 MG/DL
GLUCOSE SERPL-MCNC: 88 MG/DL
POTASSIUM SERPL-SCNC: 4.7 MMOL/L
SODIUM SERPL-SCNC: 145 MMOL/L

## 2021-09-15 ENCOUNTER — OUTPATIENT (OUTPATIENT)
Dept: OUTPATIENT SERVICES | Facility: HOSPITAL | Age: 61
LOS: 1 days | Discharge: HOME | End: 2021-09-15
Payer: MEDICARE

## 2021-09-15 DIAGNOSIS — R31.29 OTHER MICROSCOPIC HEMATURIA: ICD-10-CM

## 2021-09-15 DIAGNOSIS — Z96.659 PRESENCE OF UNSPECIFIED ARTIFICIAL KNEE JOINT: Chronic | ICD-10-CM

## 2021-09-15 PROCEDURE — 74178 CT ABD&PLV WO CNTR FLWD CNTR: CPT | Mod: 26

## 2021-09-23 ENCOUNTER — NON-APPOINTMENT (OUTPATIENT)
Age: 61
End: 2021-09-23

## 2021-09-24 NOTE — ED ADULT TRIAGE NOTE - BP NONINVASIVE SYSTOLIC (MM HG)
Patient vital signs are at baseline: Yes  Patient able to ambulate as they were prior to admission or with assist devices provided by therapies during their stay:  Yes  Patient MUST void prior to discharge:  Yes  Patient able to tolerate oral intake:  Yes  Pain has adequate pain control using Oral analgesics:  Yes     POD#2. AOx4, VSS on RA, PIV SL, intermittent abx given, reg diet, up w/ Ax1 w/ GB &Walker, voids adequately in BR,  CMS intact, PRN flexeril, scheduled toradol and tylenol, and ice for pain. Progressing per plan of care, discharge pending.    146

## 2021-10-05 ENCOUNTER — APPOINTMENT (OUTPATIENT)
Dept: UROLOGY | Facility: CLINIC | Age: 61
End: 2021-10-05
Payer: MEDICARE

## 2021-10-05 PROCEDURE — 99213 OFFICE O/P EST LOW 20 MIN: CPT

## 2021-10-05 NOTE — HISTORY OF PRESENT ILLNESS
[FreeTextEntry1] : Distant work-up for microscopic hematuria.  Cystoscopy showed no significant abnormality.  Cytology was benign.  CT scan showed no urinary tract lesions.  She does have fatty liver for which she was informed to follow-up with her internist regarding.  No gross hematuria

## 2021-10-05 NOTE — REVIEW OF SYSTEMS
[Feeling Poorly] : not feeling poorly [Nosebleeds] : no nosebleeds [Chest Pain] : no chest pain [Cough] : no cough [Constipation] : no constipation [Dysuria] : no dysuria

## 2021-10-19 ENCOUNTER — EMERGENCY (EMERGENCY)
Facility: HOSPITAL | Age: 61
LOS: 0 days | Discharge: HOME | End: 2021-10-19
Attending: EMERGENCY MEDICINE | Admitting: EMERGENCY MEDICINE
Payer: MEDICARE

## 2021-10-19 VITALS
DIASTOLIC BLOOD PRESSURE: 72 MMHG | HEIGHT: 63 IN | WEIGHT: 259.93 LBS | OXYGEN SATURATION: 99 % | RESPIRATION RATE: 18 BRPM | HEART RATE: 77 BPM | TEMPERATURE: 97 F | SYSTOLIC BLOOD PRESSURE: 137 MMHG

## 2021-10-19 VITALS
TEMPERATURE: 98 F | SYSTOLIC BLOOD PRESSURE: 146 MMHG | OXYGEN SATURATION: 99 % | RESPIRATION RATE: 18 BRPM | HEART RATE: 72 BPM | DIASTOLIC BLOOD PRESSURE: 72 MMHG

## 2021-10-19 DIAGNOSIS — N39.0 URINARY TRACT INFECTION, SITE NOT SPECIFIED: ICD-10-CM

## 2021-10-19 DIAGNOSIS — Z96.659 PRESENCE OF UNSPECIFIED ARTIFICIAL KNEE JOINT: Chronic | ICD-10-CM

## 2021-10-19 DIAGNOSIS — Z88.0 ALLERGY STATUS TO PENICILLIN: ICD-10-CM

## 2021-10-19 DIAGNOSIS — R10.9 UNSPECIFIED ABDOMINAL PAIN: ICD-10-CM

## 2021-10-19 DIAGNOSIS — I48.91 UNSPECIFIED ATRIAL FIBRILLATION: ICD-10-CM

## 2021-10-19 DIAGNOSIS — K21.9 GASTRO-ESOPHAGEAL REFLUX DISEASE WITHOUT ESOPHAGITIS: ICD-10-CM

## 2021-10-19 DIAGNOSIS — J44.9 CHRONIC OBSTRUCTIVE PULMONARY DISEASE, UNSPECIFIED: ICD-10-CM

## 2021-10-19 DIAGNOSIS — Z79.01 LONG TERM (CURRENT) USE OF ANTICOAGULANTS: ICD-10-CM

## 2021-10-19 DIAGNOSIS — G47.33 OBSTRUCTIVE SLEEP APNEA (ADULT) (PEDIATRIC): ICD-10-CM

## 2021-10-19 DIAGNOSIS — I50.9 HEART FAILURE, UNSPECIFIED: ICD-10-CM

## 2021-10-19 LAB
ALBUMIN SERPL ELPH-MCNC: 4.2 G/DL — SIGNIFICANT CHANGE UP (ref 3.5–5.2)
ALP SERPL-CCNC: 57 U/L — SIGNIFICANT CHANGE UP (ref 30–115)
ALT FLD-CCNC: 9 U/L — SIGNIFICANT CHANGE UP (ref 0–41)
ANION GAP SERPL CALC-SCNC: 11 MMOL/L — SIGNIFICANT CHANGE UP (ref 7–14)
APPEARANCE UR: CLEAR — SIGNIFICANT CHANGE UP
APTT BLD: 29.2 SEC — SIGNIFICANT CHANGE UP (ref 27–39.2)
AST SERPL-CCNC: 16 U/L — SIGNIFICANT CHANGE UP (ref 0–41)
BACTERIA # UR AUTO: ABNORMAL
BASOPHILS # BLD AUTO: 0.01 K/UL — SIGNIFICANT CHANGE UP (ref 0–0.2)
BASOPHILS NFR BLD AUTO: 0.2 % — SIGNIFICANT CHANGE UP (ref 0–1)
BILIRUB SERPL-MCNC: 0.8 MG/DL — SIGNIFICANT CHANGE UP (ref 0.2–1.2)
BILIRUB UR-MCNC: NEGATIVE — SIGNIFICANT CHANGE UP
BUN SERPL-MCNC: 9 MG/DL — LOW (ref 10–20)
CALCIUM SERPL-MCNC: 9.1 MG/DL — SIGNIFICANT CHANGE UP (ref 8.5–10.1)
CHLORIDE SERPL-SCNC: 108 MMOL/L — SIGNIFICANT CHANGE UP (ref 98–110)
CO2 SERPL-SCNC: 25 MMOL/L — SIGNIFICANT CHANGE UP (ref 17–32)
COLOR SPEC: SIGNIFICANT CHANGE UP
CREAT SERPL-MCNC: 0.7 MG/DL — SIGNIFICANT CHANGE UP (ref 0.7–1.5)
DIFF PNL FLD: ABNORMAL
EOSINOPHIL # BLD AUTO: 0.17 K/UL — SIGNIFICANT CHANGE UP (ref 0–0.7)
EOSINOPHIL NFR BLD AUTO: 3.8 % — SIGNIFICANT CHANGE UP (ref 0–8)
EPI CELLS # UR: 0 /HPF — SIGNIFICANT CHANGE UP (ref 0–5)
GLUCOSE SERPL-MCNC: 101 MG/DL — HIGH (ref 70–99)
GLUCOSE UR QL: NEGATIVE — SIGNIFICANT CHANGE UP
HCT VFR BLD CALC: 39.1 % — SIGNIFICANT CHANGE UP (ref 37–47)
HGB BLD-MCNC: 12.4 G/DL — SIGNIFICANT CHANGE UP (ref 12–16)
HYALINE CASTS # UR AUTO: 1 /LPF — SIGNIFICANT CHANGE UP (ref 0–7)
IMM GRANULOCYTES NFR BLD AUTO: 0.4 % — HIGH (ref 0.1–0.3)
INR BLD: 1.33 RATIO — HIGH (ref 0.65–1.3)
KETONES UR-MCNC: NEGATIVE — SIGNIFICANT CHANGE UP
LACTATE SERPL-SCNC: 0.8 MMOL/L — SIGNIFICANT CHANGE UP (ref 0.7–2)
LEUKOCYTE ESTERASE UR-ACNC: ABNORMAL
LIDOCAIN IGE QN: 25 U/L — SIGNIFICANT CHANGE UP (ref 7–60)
LYMPHOCYTES # BLD AUTO: 1.43 K/UL — SIGNIFICANT CHANGE UP (ref 1.2–3.4)
LYMPHOCYTES # BLD AUTO: 31.7 % — SIGNIFICANT CHANGE UP (ref 20.5–51.1)
MCHC RBC-ENTMCNC: 27.4 PG — SIGNIFICANT CHANGE UP (ref 27–31)
MCHC RBC-ENTMCNC: 31.7 G/DL — LOW (ref 32–37)
MCV RBC AUTO: 86.5 FL — SIGNIFICANT CHANGE UP (ref 81–99)
MONOCYTES # BLD AUTO: 0.35 K/UL — SIGNIFICANT CHANGE UP (ref 0.1–0.6)
MONOCYTES NFR BLD AUTO: 7.8 % — SIGNIFICANT CHANGE UP (ref 1.7–9.3)
NEUTROPHILS # BLD AUTO: 2.53 K/UL — SIGNIFICANT CHANGE UP (ref 1.4–6.5)
NEUTROPHILS NFR BLD AUTO: 56.1 % — SIGNIFICANT CHANGE UP (ref 42.2–75.2)
NITRITE UR-MCNC: POSITIVE
NRBC # BLD: 0 /100 WBCS — SIGNIFICANT CHANGE UP (ref 0–0)
PH UR: 6 — SIGNIFICANT CHANGE UP (ref 5–8)
PLATELET # BLD AUTO: 193 K/UL — SIGNIFICANT CHANGE UP (ref 130–400)
POTASSIUM SERPL-MCNC: 4.4 MMOL/L — SIGNIFICANT CHANGE UP (ref 3.5–5)
POTASSIUM SERPL-SCNC: 4.4 MMOL/L — SIGNIFICANT CHANGE UP (ref 3.5–5)
PROT SERPL-MCNC: 6.8 G/DL — SIGNIFICANT CHANGE UP (ref 6–8)
PROT UR-MCNC: NEGATIVE — SIGNIFICANT CHANGE UP
PROTHROM AB SERPL-ACNC: 15.3 SEC — HIGH (ref 9.95–12.87)
RBC # BLD: 4.52 M/UL — SIGNIFICANT CHANGE UP (ref 4.2–5.4)
RBC # FLD: 14.4 % — SIGNIFICANT CHANGE UP (ref 11.5–14.5)
RBC CASTS # UR COMP ASSIST: 2 /HPF — SIGNIFICANT CHANGE UP (ref 0–4)
SODIUM SERPL-SCNC: 144 MMOL/L — SIGNIFICANT CHANGE UP (ref 135–146)
SP GR SPEC: 1.02 — SIGNIFICANT CHANGE UP (ref 1.01–1.03)
UROBILINOGEN FLD QL: SIGNIFICANT CHANGE UP
WBC # BLD: 4.51 K/UL — LOW (ref 4.8–10.8)
WBC # FLD AUTO: 4.51 K/UL — LOW (ref 4.8–10.8)
WBC UR QL: 17 /HPF — HIGH (ref 0–5)

## 2021-10-19 PROCEDURE — 99285 EMERGENCY DEPT VISIT HI MDM: CPT

## 2021-10-19 PROCEDURE — 74177 CT ABD & PELVIS W/CONTRAST: CPT | Mod: 26,MA

## 2021-10-19 RX ORDER — AZTREONAM 2 G
2000 VIAL (EA) INJECTION ONCE
Refills: 0 | Status: DISCONTINUED | OUTPATIENT
Start: 2021-10-19 | End: 2021-10-19

## 2021-10-19 RX ORDER — DIPHENHYDRAMINE HCL 50 MG
50 CAPSULE ORAL ONCE
Refills: 0 | Status: COMPLETED | OUTPATIENT
Start: 2021-10-19 | End: 2021-10-19

## 2021-10-19 RX ORDER — SODIUM CHLORIDE 9 MG/ML
1000 INJECTION INTRAMUSCULAR; INTRAVENOUS; SUBCUTANEOUS ONCE
Refills: 0 | Status: COMPLETED | OUTPATIENT
Start: 2021-10-19 | End: 2021-10-19

## 2021-10-19 RX ORDER — KETOROLAC TROMETHAMINE 30 MG/ML
15 SYRINGE (ML) INJECTION ONCE
Refills: 0 | Status: DISCONTINUED | OUTPATIENT
Start: 2021-10-19 | End: 2021-10-19

## 2021-10-19 RX ORDER — MORPHINE SULFATE 50 MG/1
4 CAPSULE, EXTENDED RELEASE ORAL ONCE
Refills: 0 | Status: DISCONTINUED | OUTPATIENT
Start: 2021-10-19 | End: 2021-10-19

## 2021-10-19 RX ADMIN — SODIUM CHLORIDE 1000 MILLILITER(S): 9 INJECTION INTRAMUSCULAR; INTRAVENOUS; SUBCUTANEOUS at 11:04

## 2021-10-19 RX ADMIN — MORPHINE SULFATE 4 MILLIGRAM(S): 50 CAPSULE, EXTENDED RELEASE ORAL at 16:27

## 2021-10-19 RX ADMIN — Medication 15 MILLIGRAM(S): at 11:04

## 2021-10-19 RX ADMIN — Medication 15 MILLIGRAM(S): at 16:27

## 2021-10-19 RX ADMIN — Medication 50 MILLIGRAM(S): at 11:38

## 2021-10-19 RX ADMIN — SODIUM CHLORIDE 1000 MILLILITER(S): 9 INJECTION INTRAMUSCULAR; INTRAVENOUS; SUBCUTANEOUS at 12:27

## 2021-10-19 RX ADMIN — MORPHINE SULFATE 4 MILLIGRAM(S): 50 CAPSULE, EXTENDED RELEASE ORAL at 14:09

## 2021-10-19 NOTE — ED PROVIDER NOTE - CARE PROVIDER_API CALL
Quinton Rosario)  Urology  [address] [place] [address]  Phone: [phone]  Fax: [phone]  Follow Up Time: 1-3 Days

## 2021-10-19 NOTE — ED PROVIDER NOTE - PHYSICAL EXAMINATION
GENERAL: Well-nourished, Well-developed. NAD.  HEAD: No visible or palpable bumps or hematomas. No ecchymosis behind ears B/L.  Eyes: PERRLA, EOMI. No asymmetry. No nystagmus. No conjunctival injection. Non-icteric sclera.  ENMT: MMM  Neck: Supple. FROM  CVS: RRR. Normal S1,S2. No murmurs appreciated on auscultation   RESP: No use of accessory muscles. Chest rise symmetrical with good expansion. Lungs clear to auscultation B/L. No wheezing, rales, or rhonchi auscultated.  GI: Normal auscultation of bowel sounds in all 4 quadrants. (+)LLQ ttp. Soft, Nondistended. No guarding or rebound tenderness. (+)L CVAT  Skin: Warm, Dry. No rashes or lesions. Good cap refill < 2 sec B/L.  EXT: Radial and pedal pulses present B/L. No calf tenderness or swelling B/L. No palpable cords. No pedal edema B/L.  Neuro: AA&O x 3. Sensation grossly intact. Strength 5/5 B/L. Gait within normal limits.

## 2021-10-19 NOTE — ED PROVIDER NOTE - NS ED ROS FT
Constitutional: (-) fever (-) malaise (-) diaphoresis (-) chills (-) wt. loss (-) bodyaches (-) night sweats  Eyes: (-) visual changes (-) eye pain (-) eye discharge (-) photophobia (-) FB sensation  ENMT: (-) nasal or chest congestion (-) runny nose (-) sore throat (-) hoarseness  (-) hearing changes (-) ear pain (-) ear discharge or infections (-) neck pain (-) neck stiffness  Cardiac: (-) chest pain  (-) palpitations (-) syncope (-) edema  Respiratory: (-) cough (-) SOB (-) ANGULO  GI: (-) nausea (-) vomiting (-) diarrhea (+) abdominal pain (-) hematochezia (+)flank pain  : (-) dysuria (-) increased frequency  (-) hematuria (-) incontinence  MS: (-) back pain (-) myalgia (-) muscle weakness (-)  joint pain  Neuro: (-) headache (-) dizziness (-) numbness/tingling to extremities B/L (-) weakness   Skin: (-) rash (-) laceration  GYN: (-) pelvic pain (-) abnormal bleeding (-) abnormal discharge or odor  Except as documented in the HPI, all other systems are negative.

## 2021-10-19 NOTE — ED PROVIDER NOTE - NSFOLLOWUPINSTRUCTIONS_ED_ALL_ED_FT
**follow up with urologist within 1-3 days**    Urinary Tract Infection    A urinary tract infection (UTI) is an infection of any part of the urinary tract, which includes the kidneys, ureters, bladder, and urethra. Risk factors include ignoring your need to urinate, wiping back to front if female, being an uncircumcised male, and having diabetes or a weak immune system. Symptoms include frequent urination, pain or burning with urination, foul smelling urine, cloudy urine, pain in the lower abdomen, blood in the urine, and fever. If you were prescribed an antibiotic medicine, take it as told by your health care provider. Do not stop taking the antibiotic even if you start to feel better.    SEEK IMMEDIATE MEDICAL CARE IF YOU HAVE ANY OF THE FOLLOWING SYMPTOMS: severe back or abdominal pain, fever, inability to keep fluids or medicine down, dizziness/lightheadedness, or a change in mental status.

## 2021-10-19 NOTE — ED PROVIDER NOTE - PATIENT PORTAL LINK FT
You can access the FollowMyHealth Patient Portal offered by [facility] by registering at the following website: [email or web address]. By joining [employer]’s FollowMyHealth portal, you will also be able to view your health information using other applications (apps) compatible with our system.

## 2021-10-19 NOTE — ED PROVIDER NOTE - OBJECTIVE STATEMENT
62 yo F pmhx afib previously on xarelto, CHF, YOLIE, GERD, COPD, PE, DVT presenting to the ED for evaluation of L flank pain radiating to LLQ x 4days, pain is sharp/crampy and intermittent, no alleviating or provoking factors. Pt reports last month was seen by GYN and found to have microscopic hematuria, pt has outpt cystoscopy and CT abd/pelvis done a month ago WNL, pt has not taken xarelto for 1 month due to microscopic hematuria. Pt denies any fever, chills, nausea, vomiting, gross hematuria, chest pain, sob, weakness, dysuria, diarrhea.

## 2021-10-19 NOTE — ED PROVIDER NOTE - CLINICAL SUMMARY MEDICAL DECISION MAKING FREE TEXT BOX
Labs ok other than UA +nitrites, suggestive of UTI; CT negative for ureteral stone/diverticulitis. Will tx as early pyelo with abx, advise prompt f/u with urology, return precautions discussed, pt comfortable with discharge.

## 2021-10-19 NOTE — ED PROVIDER NOTE - ATTENDING CONTRIBUTION TO CARE
Updated Dr. Jean-Baptiste regarding SVE of 4/80/-2. Patient uncomfortable and requesting an epidural. Sayra El RN    60yo woman h/oa fib on xarelto, YOLIE, GERD, COPD, CHF, DVT/PE c/o LLQ pain into the L flank, sharp in nature, without associated fever, chills, nausea, vomiting, or changes in bowel habits. She has a recent hx of microscopic hematuria but CT abd/pelvis and cystoscopy were negative for etiology of bleeding and pt has been off xarelto for the past few weeks as a result. On exam she is anxious and tearful, uncomfortable due to pain but not toxic appearing. Lungs CTA, CVS1S2 RRR abd soft, moderate LLQ tenderness without guarding or rebound. Concerned for stone vs diverticulitis, will check labs, CT, reassess.

## 2021-10-20 RX ORDER — PHENAZOPYRIDINE HCL 100 MG
2 TABLET ORAL
Qty: 12 | Refills: 0
Start: 2021-10-20 | End: 2021-10-21

## 2021-10-20 RX ORDER — CEFPODOXIME PROXETIL 100 MG
1 TABLET ORAL
Qty: 20 | Refills: 0
Start: 2021-10-20 | End: 2021-10-29

## 2021-10-20 NOTE — ED POST DISCHARGE NOTE - REASON FOR FOLLOW-UP
Other Pt called to fu on prescription for UTI. New prescription sent, educated on return precautions and proper fu

## 2021-11-02 ENCOUNTER — EMERGENCY (EMERGENCY)
Facility: HOSPITAL | Age: 61
LOS: 0 days | Discharge: HOME | End: 2021-11-03
Attending: EMERGENCY MEDICINE | Admitting: EMERGENCY MEDICINE
Payer: MEDICARE

## 2021-11-02 VITALS
WEIGHT: 259.93 LBS | HEIGHT: 63 IN | RESPIRATION RATE: 18 BRPM | TEMPERATURE: 99 F | SYSTOLIC BLOOD PRESSURE: 163 MMHG | OXYGEN SATURATION: 97 % | HEART RATE: 86 BPM | DIASTOLIC BLOOD PRESSURE: 86 MMHG

## 2021-11-02 DIAGNOSIS — J44.9 CHRONIC OBSTRUCTIVE PULMONARY DISEASE, UNSPECIFIED: ICD-10-CM

## 2021-11-02 DIAGNOSIS — Z87.891 PERSONAL HISTORY OF NICOTINE DEPENDENCE: ICD-10-CM

## 2021-11-02 DIAGNOSIS — Z88.0 ALLERGY STATUS TO PENICILLIN: ICD-10-CM

## 2021-11-02 DIAGNOSIS — K21.9 GASTRO-ESOPHAGEAL REFLUX DISEASE WITHOUT ESOPHAGITIS: ICD-10-CM

## 2021-11-02 DIAGNOSIS — Z86.718 PERSONAL HISTORY OF OTHER VENOUS THROMBOSIS AND EMBOLISM: ICD-10-CM

## 2021-11-02 DIAGNOSIS — I48.91 UNSPECIFIED ATRIAL FIBRILLATION: ICD-10-CM

## 2021-11-02 DIAGNOSIS — M25.552 PAIN IN LEFT HIP: ICD-10-CM

## 2021-11-02 DIAGNOSIS — M25.551 PAIN IN RIGHT HIP: ICD-10-CM

## 2021-11-02 DIAGNOSIS — M48.00 SPINAL STENOSIS, SITE UNSPECIFIED: ICD-10-CM

## 2021-11-02 DIAGNOSIS — Z86.711 PERSONAL HISTORY OF PULMONARY EMBOLISM: ICD-10-CM

## 2021-11-02 DIAGNOSIS — Z79.01 LONG TERM (CURRENT) USE OF ANTICOAGULANTS: ICD-10-CM

## 2021-11-02 DIAGNOSIS — M54.9 DORSALGIA, UNSPECIFIED: ICD-10-CM

## 2021-11-02 DIAGNOSIS — Z96.652 PRESENCE OF LEFT ARTIFICIAL KNEE JOINT: ICD-10-CM

## 2021-11-02 DIAGNOSIS — Z96.659 PRESENCE OF UNSPECIFIED ARTIFICIAL KNEE JOINT: Chronic | ICD-10-CM

## 2021-11-02 DIAGNOSIS — E66.9 OBESITY, UNSPECIFIED: ICD-10-CM

## 2021-11-02 DIAGNOSIS — G47.30 SLEEP APNEA, UNSPECIFIED: ICD-10-CM

## 2021-11-02 DIAGNOSIS — I50.9 HEART FAILURE, UNSPECIFIED: ICD-10-CM

## 2021-11-02 LAB
ALBUMIN SERPL ELPH-MCNC: 4 G/DL — SIGNIFICANT CHANGE UP (ref 3.5–5.2)
ALP SERPL-CCNC: 62 U/L — SIGNIFICANT CHANGE UP (ref 30–115)
ALT FLD-CCNC: 8 U/L — SIGNIFICANT CHANGE UP (ref 0–41)
ANION GAP SERPL CALC-SCNC: 12 MMOL/L — SIGNIFICANT CHANGE UP (ref 7–14)
APPEARANCE UR: CLEAR — SIGNIFICANT CHANGE UP
AST SERPL-CCNC: 16 U/L — SIGNIFICANT CHANGE UP (ref 0–41)
BACTERIA # UR AUTO: NEGATIVE — SIGNIFICANT CHANGE UP
BASOPHILS # BLD AUTO: 0 K/UL — SIGNIFICANT CHANGE UP (ref 0–0.2)
BASOPHILS NFR BLD AUTO: 0 % — SIGNIFICANT CHANGE UP (ref 0–1)
BILIRUB SERPL-MCNC: 0.6 MG/DL — SIGNIFICANT CHANGE UP (ref 0.2–1.2)
BILIRUB UR-MCNC: NEGATIVE — SIGNIFICANT CHANGE UP
BUN SERPL-MCNC: 10 MG/DL — SIGNIFICANT CHANGE UP (ref 10–20)
CALCIUM SERPL-MCNC: 8.9 MG/DL — SIGNIFICANT CHANGE UP (ref 8.5–10.1)
CHLORIDE SERPL-SCNC: 104 MMOL/L — SIGNIFICANT CHANGE UP (ref 98–110)
CO2 SERPL-SCNC: 25 MMOL/L — SIGNIFICANT CHANGE UP (ref 17–32)
COLOR SPEC: YELLOW — SIGNIFICANT CHANGE UP
CREAT SERPL-MCNC: 0.8 MG/DL — SIGNIFICANT CHANGE UP (ref 0.7–1.5)
DIFF PNL FLD: ABNORMAL
EOSINOPHIL # BLD AUTO: 0.34 K/UL — SIGNIFICANT CHANGE UP (ref 0–0.7)
EOSINOPHIL NFR BLD AUTO: 6.1 % — SIGNIFICANT CHANGE UP (ref 0–8)
EPI CELLS # UR: 1 /HPF — SIGNIFICANT CHANGE UP (ref 0–5)
GLUCOSE SERPL-MCNC: 120 MG/DL — HIGH (ref 70–99)
GLUCOSE UR QL: NEGATIVE — SIGNIFICANT CHANGE UP
HCT VFR BLD CALC: 37.1 % — SIGNIFICANT CHANGE UP (ref 37–47)
HGB BLD-MCNC: 12 G/DL — SIGNIFICANT CHANGE UP (ref 12–16)
HYALINE CASTS # UR AUTO: 1 /LPF — SIGNIFICANT CHANGE UP (ref 0–7)
IMM GRANULOCYTES NFR BLD AUTO: 0.4 % — HIGH (ref 0.1–0.3)
KETONES UR-MCNC: NEGATIVE — SIGNIFICANT CHANGE UP
LEUKOCYTE ESTERASE UR-ACNC: NEGATIVE — SIGNIFICANT CHANGE UP
LYMPHOCYTES # BLD AUTO: 1.88 K/UL — SIGNIFICANT CHANGE UP (ref 1.2–3.4)
LYMPHOCYTES # BLD AUTO: 33.7 % — SIGNIFICANT CHANGE UP (ref 20.5–51.1)
MCHC RBC-ENTMCNC: 27.9 PG — SIGNIFICANT CHANGE UP (ref 27–31)
MCHC RBC-ENTMCNC: 32.3 G/DL — SIGNIFICANT CHANGE UP (ref 32–37)
MCV RBC AUTO: 86.3 FL — SIGNIFICANT CHANGE UP (ref 81–99)
MONOCYTES # BLD AUTO: 0.36 K/UL — SIGNIFICANT CHANGE UP (ref 0.1–0.6)
MONOCYTES NFR BLD AUTO: 6.5 % — SIGNIFICANT CHANGE UP (ref 1.7–9.3)
NEUTROPHILS # BLD AUTO: 2.98 K/UL — SIGNIFICANT CHANGE UP (ref 1.4–6.5)
NEUTROPHILS NFR BLD AUTO: 53.3 % — SIGNIFICANT CHANGE UP (ref 42.2–75.2)
NITRITE UR-MCNC: NEGATIVE — SIGNIFICANT CHANGE UP
NRBC # BLD: 0 /100 WBCS — SIGNIFICANT CHANGE UP (ref 0–0)
PH UR: 6 — SIGNIFICANT CHANGE UP (ref 5–8)
PLATELET # BLD AUTO: 206 K/UL — SIGNIFICANT CHANGE UP (ref 130–400)
POTASSIUM SERPL-MCNC: 3.9 MMOL/L — SIGNIFICANT CHANGE UP (ref 3.5–5)
POTASSIUM SERPL-SCNC: 3.9 MMOL/L — SIGNIFICANT CHANGE UP (ref 3.5–5)
PROT SERPL-MCNC: 6.6 G/DL — SIGNIFICANT CHANGE UP (ref 6–8)
PROT UR-MCNC: ABNORMAL
RBC # BLD: 4.3 M/UL — SIGNIFICANT CHANGE UP (ref 4.2–5.4)
RBC # FLD: 14.2 % — SIGNIFICANT CHANGE UP (ref 11.5–14.5)
RBC CASTS # UR COMP ASSIST: 5 /HPF — HIGH (ref 0–4)
SODIUM SERPL-SCNC: 141 MMOL/L — SIGNIFICANT CHANGE UP (ref 135–146)
SP GR SPEC: 1.03 — SIGNIFICANT CHANGE UP (ref 1.01–1.03)
UROBILINOGEN FLD QL: ABNORMAL
WBC # BLD: 5.58 K/UL — SIGNIFICANT CHANGE UP (ref 4.8–10.8)
WBC # FLD AUTO: 5.58 K/UL — SIGNIFICANT CHANGE UP (ref 4.8–10.8)
WBC UR QL: 1 /HPF — SIGNIFICANT CHANGE UP (ref 0–5)

## 2021-11-02 PROCEDURE — 99285 EMERGENCY DEPT VISIT HI MDM: CPT

## 2021-11-02 PROCEDURE — 76775 US EXAM ABDO BACK WALL LIM: CPT | Mod: 26

## 2021-11-02 RX ORDER — DEXAMETHASONE 0.5 MG/5ML
6 ELIXIR ORAL ONCE
Refills: 0 | Status: COMPLETED | OUTPATIENT
Start: 2021-11-02 | End: 2021-11-02

## 2021-11-02 RX ORDER — METHOCARBAMOL 500 MG/1
1500 TABLET, FILM COATED ORAL ONCE
Refills: 0 | Status: COMPLETED | OUTPATIENT
Start: 2021-11-02 | End: 2021-11-02

## 2021-11-02 RX ORDER — KETOROLAC TROMETHAMINE 30 MG/ML
30 SYRINGE (ML) INJECTION ONCE
Refills: 0 | Status: DISCONTINUED | OUTPATIENT
Start: 2021-11-02 | End: 2021-11-02

## 2021-11-02 RX ORDER — MORPHINE SULFATE 50 MG/1
4 CAPSULE, EXTENDED RELEASE ORAL ONCE
Refills: 0 | Status: DISCONTINUED | OUTPATIENT
Start: 2021-11-02 | End: 2021-11-02

## 2021-11-02 RX ADMIN — Medication 30 MILLIGRAM(S): at 20:45

## 2021-11-02 RX ADMIN — Medication 6 MILLIGRAM(S): at 22:28

## 2021-11-02 RX ADMIN — METHOCARBAMOL 1500 MILLIGRAM(S): 500 TABLET, FILM COATED ORAL at 20:45

## 2021-11-02 RX ADMIN — MORPHINE SULFATE 4 MILLIGRAM(S): 50 CAPSULE, EXTENDED RELEASE ORAL at 22:28

## 2021-11-02 NOTE — ED ADULT TRIAGE NOTE - CHIEF COMPLAINT QUOTE
Pt reports being dx with UTI last week and now has pains to bilateral flank and front left side of her abdomen due to pain. Pt reports took full abx course and pain medication prescribed but now pain is increasingly worse.

## 2021-11-02 NOTE — ED ADULT NURSE NOTE - DRUG PRE-SCREENING (DAST -1)
Problem: Overarching Goals (Adult)  Goal: Develops/Participates in Therapeutic [place] to Support Successful Transition  Outcome: Ongoing (interventions implemented as appropriate)  Group Note     Behavior:  Patient attended Psychotherapy Group and participated. Patient presents with pleasant mood and affect. Patient became tearful when discussing loss of her sister.      Intervention:  [place] - Patients tanya their version of a mountaint[place] and listed their high and low points in life. Discussed coping with difficulties and learning from life experiences. Reflected on recognizing getting through tough times builds resilience and finding balance.      Response:  Patient noted her high points as buying her firsts home and first car  eAnd having her son and lows as death of her sister, divorce and losing a job.   Patient states she has to work on where she will live and finding a job.         Plan:  Will continue to encourage Patient participation in group. Will meet 1:1 with patient.                     Statement Selected

## 2021-11-03 RX ORDER — IBUPROFEN 200 MG
1 TABLET ORAL
Qty: 12 | Refills: 0
Start: 2021-11-03 | End: 2021-11-06

## 2021-11-03 RX ORDER — METHOCARBAMOL 500 MG/1
2 TABLET, FILM COATED ORAL
Qty: 40 | Refills: 0
Start: 2021-11-03 | End: 2021-11-07

## 2021-11-03 RX ORDER — LIDOCAINE 4 G/100G
2 CREAM TOPICAL ONCE
Refills: 0 | Status: COMPLETED | OUTPATIENT
Start: 2021-11-03 | End: 2021-11-03

## 2021-11-03 RX ORDER — LIDOCAINE 4 G/100G
1 CREAM TOPICAL
Qty: 10 | Refills: 0
Start: 2021-11-03

## 2021-11-03 RX ADMIN — LIDOCAINE 2 PATCH: 4 CREAM TOPICAL at 01:15

## 2021-11-03 NOTE — ED PROVIDER NOTE - NSICDXFAMILYHX_GEN_ALL_CORE_FT
FAMILY HISTORY:  Sibling  Still living? Unknown  Family history of blood clots, Age at diagnosis: Age Unknown

## 2021-11-03 NOTE — ED PROVIDER NOTE - ATTENDING CONTRIBUTION TO CARE
60 yo female with PMH of DVT/PE in 2014, afib (on Xarelto), COPD, sleep apnea, GERD, pre-DM, microscopic hematuria, obesity, Left Total knee replacement, ankle surgery, high chol, lap band surgery, spinal stenosis, IBS-constipation presents to the ER for b/l hip pain, with more pain to LEFT side than right with radiation of pain down left buttock to left posterior thigh today. Pt here Oct 19 for flank pain and found to have UTI but neg CT for any renal stones or obstructive uropathy. Pt states she completed her course of antibiotics that she was prescribed. Felt pain to her hips but then today pain became severe and could not find a comfortable position to sit or lay down. Pt generally uses a walker but pain so severe she felt she couldn't walk because of it even with walker. No weakness, +tingling/numbness/burning to left buttock down back of left thigh. No rash/trauma/SOB/CP/calf tenderness or swelling/dizziness/HA/abdomen pain. Pt denies current dysuria, but states she has known hematuria hx. Exam pt obese female in mod distress as she is restless on stretcher unable to get into comfortable position pointing pain to the left hip/buttock, ncat, no cspine tenderness, lungs ctab, heart regular s1s2, abdomen soft nt/nd +BS, no palpable mass, ext +tenderness to left buttock area with radiation down posterior distal left thigh with no edema/rash/swelling, Distal pulses intact. Motor-sensory intact. Pt clinically appears to have sciatica type of pain, however given hematuria will also check US retroperitoneal to assess for hydro (to assess for signif obstructing stones or other obstructive uropathy--pt just had CT abdom on 10/19 therefore attempting to limit radiation today).  Howevere, US neg. Pain meds given including lido patch/nsaids/muscle relaxer/steroid/opiate. Pt felt some improvement of pain, enough to be able to ambulate. Results reviewed. Strongly recommend follow up with spine specialist/pain management given her spinal stenosis and with physical therapy department. Return precautions given    ALL: pcn  Meds Xarelto, metoprolol, lovastatin, metformin, linzess, albuterol, symbicort  SH fomer smoker, no etoh  PMD Simona

## 2021-11-03 NOTE — ED PROVIDER NOTE - NSFOLLOWUPCLINICS_GEN_ALL_ED_FT
[facility] ([facility])  Rehabilitation  [facility] [address], [address] [place] [address]  Phone: [phone]  Fax:     [facility] ([facility])  Rehabilitation  [address] [place] [id number]  Phone: [phone]  Fax:     Neurosurgery at [place]  Neurosurgery  [address] [place] [address], [address]  [place] [address]  Phone: [phone]  Fax:     [facility]  Neurosurgery  Referral Assistance Program  [place]   Phone:   Fax:

## 2021-11-03 NOTE — ED PROVIDER NOTE - CARE PROVIDER_API CALL
Dale Medina (MD)  Anesthesiology; Pain Medicine  [address] [place] [address]  Phone: [phone]  Fax: [phone]  Follow Up Time:

## 2021-11-03 NOTE — ED PROVIDER NOTE - NSFOLLOWUPCLINICSTOKEN_GEN_ALL_ED_FT
[id number]: || ||00\01||False;[id number]: || ||00\01||False;[id number]: || ||00\01||False;[id number]: || ||00\01||False;

## 2021-11-03 NOTE — ED PROVIDER NOTE - NSFOLLOWUPINSTRUCTIONS_ED_ALL_ED_FT
MAKE AN APPOINTMENT WITH THE SPINE SPECALIST AND PAIN MANAGEMENT. IF YOU ARE UNABLE TO MAKE AN APPOINTMENT WITH THIS PRACTICE , LOOK ONLINE ON "[employer]" FOR ANOTHER PROVIDER. TAKE YOUR MEDS AS PRESCRIBED IN ADDITION TO MAKING AN APPOINTMENT WITH PHYSICAL THERAPY.    Back Pain    WHAT YOU NEED TO KNOW:    Back pain is common. It can be caused by many conditions, such as arthritis or the breakdown of spinal discs. Your risk for back pain is increased by injuries, lack of activity, or repeated bending and twisting. You may feel sore or stiff on one or both sides of your back. The pain may spread to your buttocks or thighs.    DISCHARGE INSTRUCTIONS:    Return to the emergency department if:     You have pain, numbness, or weakness in one or both legs.      Your pain becomes so severe that you cannot walk.      You cannot control your urine or bowel movements.      You have severe back pain with chest pain.      You have severe back pain, nausea, and vomiting.      You have severe back pain that spreads to your side or genital area.    Contact your healthcare provider if:     You have back pain that does not get better with rest and pain medicine.      You have a fever.      You have pain that worsens when you are on your back or when you rest.      You have pain that worsens when you cough or sneeze.      You lose weight without trying.      You have questions or concerns about your condition or care.    Medicines:     NSAIDs help decrease swelling and pain. This medicine is available with or without a doctor's order. NSAIDs can cause stomach bleeding or kidney problems in certain people. If you take blood thinner medicine, always ask your healthcare provider if NSAIDs are safe for you. Always read the medicine label and follow directions.      Acetaminophen decreases pain and fever. It is available without a doctor's order. Ask how much to take and how often to take it. Follow directions. Read the labels of all other medicines you are using to see if they also contain acetaminophen, or ask your doctor or pharmacist. Acetaminophen can cause liver damage if not taken correctly. Do not use more than 4 grams (4,000 milligrams) total of acetaminophen in one day.       Muscle relaxers help decrease muscle spasms and back pain.      Prescription pain medicine may be given. Ask your healthcare provider how to take this medicine safely. Some prescription pain medicines contain acetaminophen. Do not take other medicines that contain acetaminophen without talking to your healthcare provider. Too much acetaminophen may cause liver damage. Prescription pain medicine may cause constipation. Ask your healthcare provider how to prevent or treat constipation.       Take your medicine as directed. Contact your healthcare provider if you think your medicine is not helping or if you have side effects. Tell him or her if you are allergic to any medicine. Keep a list of the medicines, vitamins, and herbs you take. Include the amounts, and when and why you take them. Bring the list or the pill bottles to follow-up visits. Carry your medicine list with you in case of an emergency.    How to manage your back pain:     Apply ice on your back for 15 to 20 minutes every hour or as directed. Use an ice pack, or put crushed ice in a plastic bag. Cover it with a towel before you apply it to your skin. Ice helps prevent tissue damage and decreases pain.      Apply heat on your back for 20 to 30 minutes every 2 hours for as many days as directed. Heat helps decrease pain and muscle spasms.      Stay active as much as you can without causing more pain. Bed rest could make your back pain worse. Avoid heavy lifting until your pain is gone.      Go to physical therapy as directed. A physical therapist can teach you exercises to help improve movement and strength, and to decrease pain.    Follow up with your healthcare provider in 2 weeks, or as directed: Write down your questions so you remember to ask them during your visits.       © Copyright [employer] 2019 All illustrations and images included in CareNotes are the copyrighted property of [employer] or [employer].

## 2021-11-03 NOTE — ED PROVIDER NOTE - CLINICAL SUMMARY MEDICAL DECISION MAKING FREE TEXT BOX
60 yo female with PMH of DVT/PE in 2014, afib (on Xarelto), COPD, sleep apnea, GERD, pre-DM, microscopic hematuria, obesity, Left Total knee replacement, ankle surgery, high chol, lap band surgery, spinal stenosis, IBS-constipation presents to the ER for b/l hip pain, with more pain to LEFT side than right with radiation of pain down left buttock to left posterior thigh today. Pt here Oct 19 for flank pain and found to have UTI but neg CT for any renal stones or obstructive uropathy. Pt states she completed her course of antibiotics that she was prescribed. Felt pain to her hips but then today pain became severe and could not find a comfortable position to sit or lay down. Pt generally uses a walker but pain so severe she felt she couldn't walk because of it even with walker. No weakness, +tingling/numbness/burning to left buttock down back of left thigh. No rash/trauma/SOB/CP/calf tenderness or swelling/dizziness/HA/abdomen pain. Pt denies current dysuria, but states she has known hematuria hx. Exam pt obese female in mod distress as she is restless on stretcher unable to get into comfortable position pointing pain to the left hip/buttock, ncat, no cspine tenderness, lungs ctab, heart regular s1s2, abdomen soft nt/nd +BS, no palpable mass, ext +tenderness to left buttock area with radiation down posterior distal left thigh with no edema/rash/swelling, Distal pulses intact. Motor-sensory intact. Pt clinically appears to have sciatica type of pain, however given hematuria will also check US retroperitoneal to assess for hydro (to assess for signif obstructing stones or other obstructive uropathy--pt just had CT abdom on 10/19 therefore attempting to limit radiation today).  Howevere, US neg. Pain meds given including lido patch/nsaids/muscle relaxer/steroid/opiate. Pt felt some improvement of pain, enough to be able to ambulate. Results reviewed. Strongly recommend follow up with spine specialist/pain management given her spinal stenosis and with physical therapy department. Return precautions given

## 2021-11-03 NOTE — ED PROVIDER NOTE - PATIENT PORTAL LINK FT
You can access the FollowMyHealth Patient Portal offered by  by registering at the following website: [email or web address]. By joining [employer]’s FollowMyHealth portal, you will also be able to view your health information using other applications (apps) compatible with our system.

## 2021-11-03 NOTE — ED PROVIDER NOTE - NSICDXPASTMEDICALHX_GEN_ALL_CORE_FT
PAST MEDICAL HISTORY:  Asthma, unspecified asthma severity, unspecified whether complicated, unspecified whether persistent     Atrial fibrillation, unspecified type     Chronic obstructive pulmonary disease, unspecified COPD type     Congestive heart failure, unspecified HF chronicity, unspecified heart failure type     DVT (deep venous thrombosis)     Gastroesophageal reflux disease without esophagitis     PE (pulmonary thromboembolism)     Sleep apnea, unspecified type

## 2021-11-03 NOTE — ED PROVIDER NOTE - PROGRESS NOTE DETAILS
dc: US results discussed with pt, all labs discussed with pt. Understands to f/u with neurospine specialist and PT outpt. Rx sent to pts pharm ([facility]).

## 2021-11-03 NOTE — ED PROVIDER NOTE - OBJECTIVE STATEMENT
61 y.o. F, pmh of Asthma , COPD, Afib on Eloquis and Spinal Stenosis presenting for B/l hip pain with sharp pain radiating down left leg intermittently. Was recently tx'd for UTI 2 weeks ago. Denies trauma, fever chills HA ,dizziness cp, palps, sob, cough, abd pain paresthesias, weakness, urinary/bowel incontinence or saddle anesthesia.

## 2021-11-06 ENCOUNTER — OUTPATIENT (OUTPATIENT)
Dept: OUTPATIENT SERVICES | Facility: HOSPITAL | Age: 61
LOS: 1 days | Discharge: HOME | End: 2021-11-06
Payer: MEDICARE

## 2021-11-06 ENCOUNTER — RESULT REVIEW (OUTPATIENT)
Age: 61
End: 2021-11-06

## 2021-11-06 DIAGNOSIS — Z96.659 PRESENCE OF UNSPECIFIED ARTIFICIAL KNEE JOINT: Chronic | ICD-10-CM

## 2021-11-06 DIAGNOSIS — M54.50 LOW BACK PAIN, UNSPECIFIED: ICD-10-CM

## 2021-11-06 LAB
-  AMPICILLIN: SIGNIFICANT CHANGE UP
-  AMPICILLIN: SIGNIFICANT CHANGE UP
-  CIPROFLOXACIN: SIGNIFICANT CHANGE UP
-  CIPROFLOXACIN: SIGNIFICANT CHANGE UP
-  LEVOFLOXACIN: SIGNIFICANT CHANGE UP
-  LEVOFLOXACIN: SIGNIFICANT CHANGE UP
-  NITROFURANTOIN: SIGNIFICANT CHANGE UP
-  NITROFURANTOIN: SIGNIFICANT CHANGE UP
-  TETRACYCLINE: SIGNIFICANT CHANGE UP
-  TETRACYCLINE: SIGNIFICANT CHANGE UP
-  VANCOMYCIN: SIGNIFICANT CHANGE UP
-  VANCOMYCIN: SIGNIFICANT CHANGE UP
CULTURE RESULTS: SIGNIFICANT CHANGE UP
METHOD TYPE: SIGNIFICANT CHANGE UP
METHOD TYPE: SIGNIFICANT CHANGE UP
ORGANISM # SPEC MICROSCOPIC CNT: SIGNIFICANT CHANGE UP
SPECIMEN SOURCE: SIGNIFICANT CHANGE UP

## 2021-11-06 PROCEDURE — 72114 X-RAY EXAM L-S SPINE BENDING: CPT | Mod: 26

## 2021-11-08 RX ORDER — NITROFURANTOIN MACROCRYSTAL 50 MG
1 CAPSULE ORAL
Qty: 14 | Refills: 0
Start: 2021-11-08 | End: 2021-11-14

## 2021-11-12 ENCOUNTER — APPOINTMENT (OUTPATIENT)
Dept: SURGERY | Facility: CLINIC | Age: 61
End: 2021-11-12
Payer: MEDICARE

## 2021-11-12 VITALS
BODY MASS INDEX: 49 KG/M2 | WEIGHT: 280 LBS | SYSTOLIC BLOOD PRESSURE: 132 MMHG | HEIGHT: 63.5 IN | OXYGEN SATURATION: 98 % | TEMPERATURE: 97.8 F | DIASTOLIC BLOOD PRESSURE: 84 MMHG | HEART RATE: 92 BPM

## 2021-11-12 DIAGNOSIS — Z86.718 PERSONAL HISTORY OF OTHER VENOUS THROMBOSIS AND EMBOLISM: ICD-10-CM

## 2021-11-12 DIAGNOSIS — Z86.711 PERSONAL HISTORY OF PULMONARY EMBOLISM: ICD-10-CM

## 2021-11-12 DIAGNOSIS — G47.30 SLEEP APNEA, UNSPECIFIED: ICD-10-CM

## 2021-11-12 PROCEDURE — 99214 OFFICE O/P EST MOD 30 MIN: CPT

## 2021-11-12 RX ORDER — PREDNISONE 50 MG/1
50 TABLET ORAL
Qty: 3 | Refills: 0 | Status: DISCONTINUED | COMMUNITY
Start: 2021-08-05 | End: 2021-11-12

## 2021-11-12 RX ORDER — CAMPHOR 0.45 %
25 GEL (GRAM) TOPICAL
Qty: 1 | Refills: 0 | Status: DISCONTINUED | COMMUNITY
Start: 2021-08-05 | End: 2021-11-12

## 2021-11-12 RX ORDER — RIVAROXABAN 2.5 MG/1
TABLET, FILM COATED ORAL
Refills: 0 | Status: DISCONTINUED | COMMUNITY
End: 2021-11-12

## 2021-11-12 NOTE — HISTORY OF PRESENT ILLNESS
[de-identified] : Ms. SUKHJINDER RUIZ is a pleasant 61 year year old female who has been struggling with Her weight for many years.    Ms. SUKHJINDER RUIZ has tried countless diet and exercise programs, but has been unable to lose sufficient weight and keep it off.  She is here today to discuss surgical options for weight loss.\par \par She was seeing us in preparation for bariatric surgery in the past, but she has not been here for several months. She is now ready to pursue surgery.  Since we last saw herm, she has been taken off of her Xarelto and reports that she no longer has blood clots.  The Xarelto was stopped because of hematuria.  her PMD, who is her pulmonologist, stopped it. \par \par She states that she used to suffer from GERD but since avoiding foods that trigger it, she has felt much better.  She had a recent EGD.

## 2021-11-12 NOTE — PLAN
[FreeTextEntry1] : Repeat testing\par Obtain EGD\par Needs heme clearance and venous duplex for PMH of pulmonary embolism\par F/u in 2 months

## 2021-11-15 ENCOUNTER — NON-APPOINTMENT (OUTPATIENT)
Age: 61
End: 2021-11-15

## 2021-11-22 LAB
APPEARANCE: CLEAR
BACTERIA UR CULT: NORMAL
BILIRUBIN URINE: NEGATIVE
BLOOD URINE: NORMAL
COLOR: NORMAL
GLUCOSE QUALITATIVE U: NEGATIVE
KETONES URINE: NEGATIVE
LEUKOCYTE ESTERASE URINE: NEGATIVE
NITRITE URINE: NEGATIVE
PH URINE: 6
PROTEIN URINE: NORMAL
SPECIFIC GRAVITY URINE: 1.02
UROBILINOGEN URINE: NORMAL

## 2021-11-29 ENCOUNTER — NON-APPOINTMENT (OUTPATIENT)
Age: 61
End: 2021-11-29

## 2021-11-29 LAB
25(OH)D3 SERPL-MCNC: 29 NG/ML
ALBUMIN SERPL ELPH-MCNC: 4.2 G/DL
ALP BLD-CCNC: 61 U/L
ALT SERPL-CCNC: 11 U/L
ANION GAP SERPL CALC-SCNC: 11 MMOL/L
APTT BLD: 31.5 SEC
AST SERPL-CCNC: 16 U/L
BASOPHILS # BLD AUTO: 0.01 K/UL
BASOPHILS NFR BLD AUTO: 0.2 %
BILIRUB SERPL-MCNC: 0.8 MG/DL
BUN SERPL-MCNC: 11 MG/DL
CALCIUM SERPL-MCNC: 9.5 MG/DL
CHLORIDE SERPL-SCNC: 104 MMOL/L
CHOLEST SERPL-MCNC: 217 MG/DL
CO2 SERPL-SCNC: 27 MMOL/L
CREAT SERPL-MCNC: 0.8 MG/DL
EOSINOPHIL # BLD AUTO: 0.3 K/UL
EOSINOPHIL NFR BLD AUTO: 4.7 %
ESTIMATED AVERAGE GLUCOSE: 131 MG/DL
FERRITIN SERPL-MCNC: 110 NG/ML
FOLATE RBC-MCNC: 955 NG/ML
GGT SERPL-CCNC: 17 U/L
GLUCOSE SERPL-MCNC: 100 MG/DL
HBA1C MFR BLD HPLC: 6.2 %
HCT VFR BLD CALC: 40.7 %
HCT VFR BLD CALC: 42.2 %
HDLC SERPL-MCNC: 56 MG/DL
HGB BLD-MCNC: 12.9 G/DL
IMM GRANULOCYTES NFR BLD AUTO: 0.3 %
INR PPP: 1.33 RATIO
IRON SATN MFR SERPL: 21 %
IRON SERPL-MCNC: 58 UG/DL
LDLC SERPL CALC-MCNC: 147 MG/DL
LYMPHOCYTES # BLD AUTO: 2.33 K/UL
LYMPHOCYTES NFR BLD AUTO: 36.8 %
MAN DIFF?: NORMAL
MCHC RBC-ENTMCNC: 28.2 PG
MCHC RBC-ENTMCNC: 31.7 G/DL
MCV RBC AUTO: 89.1 FL
MONOCYTES # BLD AUTO: 0.39 K/UL
MONOCYTES NFR BLD AUTO: 6.2 %
NEUTROPHILS # BLD AUTO: 3.28 K/UL
NEUTROPHILS NFR BLD AUTO: 51.8 %
NONHDLC SERPL-MCNC: 161 MG/DL
PLATELET # BLD AUTO: 240 K/UL
POTASSIUM SERPL-SCNC: 4.4 MMOL/L
PROT SERPL-MCNC: 7.2 G/DL
PT BLD: 15.3 SEC
RBC # BLD: 4.57 M/UL
RBC # FLD: 14.5 %
SODIUM SERPL-SCNC: 142 MMOL/L
T3FREE SERPL-MCNC: 2.73 PG/ML
T4 FREE SERPL-MCNC: 1 NG/DL
TIBC SERPL-MCNC: 281 UG/DL
TRIGL SERPL-MCNC: 97 MG/DL
TSH SERPL-ACNC: 1.61 UIU/ML
UIBC SERPL-MCNC: 223 UG/DL
VIT B1 SERPL-MCNC: 119.5 NMOL/L
VIT B12 SERPL-MCNC: 493 PG/ML
VIT C SERPL-MCNC: 0.8 MG/DL
WBC # FLD AUTO: 6.33 K/UL

## 2021-12-01 LAB — VIT A SERPL-MCNC: 36.4 UG/DL

## 2021-12-03 ENCOUNTER — APPOINTMENT (OUTPATIENT)
Dept: CARDIOLOGY | Facility: CLINIC | Age: 61
End: 2021-12-03
Payer: MEDICARE

## 2021-12-03 VITALS
WEIGHT: 285 LBS | SYSTOLIC BLOOD PRESSURE: 140 MMHG | HEIGHT: 63 IN | TEMPERATURE: 96.9 F | DIASTOLIC BLOOD PRESSURE: 80 MMHG | HEART RATE: 74 BPM | BODY MASS INDEX: 50.5 KG/M2

## 2021-12-03 PROCEDURE — 99215 OFFICE O/P EST HI 40 MIN: CPT

## 2021-12-03 PROCEDURE — 93000 ELECTROCARDIOGRAM COMPLETE: CPT

## 2022-01-03 ENCOUNTER — APPOINTMENT (OUTPATIENT)
Dept: CARDIOLOGY | Facility: CLINIC | Age: 62
End: 2022-01-03

## 2022-01-10 ENCOUNTER — APPOINTMENT (OUTPATIENT)
Dept: CARDIOLOGY | Facility: CLINIC | Age: 62
End: 2022-01-10

## 2022-01-11 ENCOUNTER — APPOINTMENT (OUTPATIENT)
Dept: UROLOGY | Facility: CLINIC | Age: 62
End: 2022-01-11

## 2022-01-11 ENCOUNTER — APPOINTMENT (OUTPATIENT)
Dept: CARDIOLOGY | Facility: CLINIC | Age: 62
End: 2022-01-11
Payer: MEDICARE

## 2022-01-11 PROCEDURE — 93306 TTE W/DOPPLER COMPLETE: CPT

## 2022-01-11 PROCEDURE — ZZZZZ: CPT

## 2022-01-12 ENCOUNTER — APPOINTMENT (OUTPATIENT)
Dept: SURGERY | Facility: CLINIC | Age: 62
End: 2022-01-12
Payer: MEDICARE

## 2022-01-12 DIAGNOSIS — J45.909 UNSPECIFIED ASTHMA, UNCOMPLICATED: ICD-10-CM

## 2022-01-12 DIAGNOSIS — K21.9 GASTRO-ESOPHAGEAL REFLUX DISEASE W/OUT ESOPHAGITIS: ICD-10-CM

## 2022-01-12 DIAGNOSIS — E66.01 MORBID (SEVERE) OBESITY DUE TO EXCESS CALORIES: ICD-10-CM

## 2022-01-12 DIAGNOSIS — E11.9 TYPE 2 DIABETES MELLITUS W/OUT COMPLICATIONS: ICD-10-CM

## 2022-01-12 DIAGNOSIS — I10 ESSENTIAL (PRIMARY) HYPERTENSION: ICD-10-CM

## 2022-01-12 PROCEDURE — 99213 OFFICE O/P EST LOW 20 MIN: CPT | Mod: 95

## 2022-01-12 NOTE — PLAN
[FreeTextEntry1] : I spoke with her PMD and PUlmonologist, Dr. Jin Jarvis, today who said that Dali stopped her Xarelto on her own.  He does not see a reason why she cannot take it.\par \par I will reach out to her hematologist, Dr. Windy Davison, to discuss her recommendations and hesitations with Dali undergoing bariatric surgery.\par \par Dali is a high risk for surgery and I agree that she should not have surgery unless she is compliant with her medications, specifically her blood thinners, given her history of DVT.\par \par \par \par

## 2022-01-12 NOTE — HISTORY OF PRESENT ILLNESS
[Home] : at home, [unfilled] , at the time of the visit. [Medical Office: ([facility])___] : at the medical office located in  [Verbal consent obtained from patient] : the patient, [unfilled] [de-identified] : Dali is being seen today for follow up as she prepares for possible bariatric surgery.  She reports today that her hematologist will not clear her if she is not taking her oral anticoagulant medications.  It is unclear why she is not taking her Xarelto.

## 2022-01-12 NOTE — ASSESSMENT
[FreeTextEntry1] : 62 y/o female with Class 3 obesity, asthma, DM who is interested in weight loss surgery, however she is having a hard time obtaining clearances because of her various comorbidities.  \par \par \par

## 2022-03-01 ENCOUNTER — APPOINTMENT (OUTPATIENT)
Dept: UROLOGY | Facility: CLINIC | Age: 62
End: 2022-03-01
Payer: MEDICARE

## 2022-03-01 PROCEDURE — 99214 OFFICE O/P EST MOD 30 MIN: CPT

## 2022-03-01 NOTE — ASSESSMENT
[FreeTextEntry1] : Patient with benign essential hematuria so urine analysis showing microscopic hematuria is expected.  We will get noninvasive follow-up work-up including renal sonogram, bladder sonogram, and urine cytology. [Urinary Symptom or Sign (788.99\R39.89)] : implantation

## 2022-03-01 NOTE — REVIEW OF SYSTEMS
[Fever] : no fever [Constipation] : no constipation [Confused] : no confusion [Easy Bleeding] : no tendency for easy bleeding

## 2022-03-01 NOTE — HISTORY OF PRESENT ILLNESS
[FreeTextEntry1] : Patient with history of benign microscopic hematuria.  She had a work-up 6 months ago which was benign.  UA now shows persistent microscopic hematuria.  No gross hematuria.  No longer on Xarelto.  Also has some bladder pain

## 2022-04-04 ENCOUNTER — NON-APPOINTMENT (OUTPATIENT)
Age: 62
End: 2022-04-04

## 2022-04-08 ENCOUNTER — RESULT REVIEW (OUTPATIENT)
Age: 62
End: 2022-04-08

## 2022-04-08 ENCOUNTER — OUTPATIENT (OUTPATIENT)
Dept: OUTPATIENT SERVICES | Facility: HOSPITAL | Age: 62
LOS: 1 days | Discharge: HOME | End: 2022-04-08
Payer: MEDICARE

## 2022-04-08 DIAGNOSIS — Z96.659 PRESENCE OF UNSPECIFIED ARTIFICIAL KNEE JOINT: Chronic | ICD-10-CM

## 2022-04-08 DIAGNOSIS — R39.9 UNSPECIFIED SYMPTOMS AND SIGNS INVOLVING THE GENITOURINARY SYSTEM: ICD-10-CM

## 2022-04-08 DIAGNOSIS — N20.2 CALCULUS OF KIDNEY WITH CALCULUS OF URETER: ICD-10-CM

## 2022-04-08 PROCEDURE — 76770 US EXAM ABDO BACK WALL COMP: CPT | Mod: 26

## 2022-04-11 NOTE — HISTORY OF PRESENT ILLNESS
[FreeTextEntry1] : 58 y/o female presents w/ bilateral foot pain. Pain has increased in the last several weeks. Exacerbated by ambulation. No recent trauma (has a fall h/o 1 year ago)\par MRI:1. Focal hypertrophic tear of the posterior tibialis with associated  tenosynovitis and surrounding inflammation involving approximately 3.5 cm of tendon length. \par 2. Sprain of the tibiospring and superomedial fibers of the spring ligament. \par 3. 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 \par 4. Fatty atrophy of the abductor digit minimi consistent with neuropathy. \par 5. Strain of the abductor hallucis muscle. \par 6. Additional degenerative change as above. AMS

## 2022-04-19 ENCOUNTER — LABORATORY RESULT (OUTPATIENT)
Age: 62
End: 2022-04-19

## 2022-04-19 ENCOUNTER — APPOINTMENT (OUTPATIENT)
Dept: UROLOGY | Facility: CLINIC | Age: 62
End: 2022-04-19
Payer: MEDICARE

## 2022-04-19 VITALS — WEIGHT: 285 LBS | HEIGHT: 63 IN | BODY MASS INDEX: 50.5 KG/M2

## 2022-04-19 DIAGNOSIS — R39.9 UNSPECIFIED SYMPTOMS AND SIGNS INVOLVING THE GENITOURINARY SYSTEM: ICD-10-CM

## 2022-04-19 DIAGNOSIS — R31.29 OTHER MICROSCOPIC HEMATURIA: ICD-10-CM

## 2022-04-19 PROCEDURE — 99214 OFFICE O/P EST MOD 30 MIN: CPT

## 2022-04-19 NOTE — HISTORY OF PRESENT ILLNESS
[FreeTextEntry1] : 62 year old with history of benign microscopic hematuria. She had work up over 6 months ago which was benign. Patient last seen 03/01/2022 where UA showed persistent microscopic hematuria. Patient presents to office today to review US Kidney and Bladder.  Done 04/08/2022- No hydronephrosis bilaterally or calculi. No debris or calculus in urinary bladder. Bilateral jets are visualized. Circumferential bladder wall thickening. No significant PVR. Urine cytology was not done. \par \par Patient reports feeling well. She states she has intermittent mild dysuria. She denies flank pain, gross hematuria, and fevers.

## 2022-04-19 NOTE — ASSESSMENT
[FreeTextEntry1] : Benign essential microhematuria. \par US reviewed with patient. \par \par Will obtain UA U culture and U cytology. \par \par Plan\par -Follow up 1 year \par -Patient instructed to call office in 1 week to review urine testing.  [Urinary Symptom or Sign (788.99\R39.89)] : implantation

## 2022-04-20 LAB
APPEARANCE: CLEAR
BILIRUBIN URINE: NEGATIVE
BLOOD URINE: ABNORMAL
COLOR: YELLOW
GLUCOSE QUALITATIVE U: NEGATIVE
KETONES URINE: NEGATIVE
LEUKOCYTE ESTERASE URINE: NEGATIVE
NITRITE URINE: NEGATIVE
PH URINE: 5.5
PROTEIN URINE: NORMAL
SPECIFIC GRAVITY URINE: 1.02
UROBILINOGEN URINE: NORMAL

## 2022-04-21 LAB — BACTERIA UR CULT: NORMAL

## 2022-04-26 ENCOUNTER — NON-APPOINTMENT (OUTPATIENT)
Age: 62
End: 2022-04-26

## 2022-04-26 LAB — URINE CYTOLOGY: NORMAL

## 2022-05-15 NOTE — ED PROVIDER NOTE - PHYSICAL EXAMINATION
[age] Physical Exam    Vital Signs: I have reviewed the initial vital signs.  Constitutional: well-nourished, appears stated age, no acute distress  Eyes: Conjunctiva pink, Sclera clear, PERRLA, EOMI, no ptosis, no entrapment, no racoon eyes.    Cardiovascular: S1 and S2, regular rate, calves nonttp, equal in size  Respiratory: unlabored respiratory effort, speaking in full sentences, handling oral secretions, clear to auscultation bilaterally no wheezing, rales and rhonchi  Gastrointestinal: soft, non-tender abdomen, no pulsatile mass, normal bowl sounds, no CVAT b/l  Musculoskeletal: supple neck, no lower extremity edema, no midline tenderness, + reproducible paraspinal tenderness, moving all extremities appropriately, no gross bony deformities or swelling. + L straight leg raise   Integumentary: warm, dry, no rashes, lacerations,  Neurologic: awake, alert, cranial nerves II-XII grossly intact, extremities’ motor and sensory functions grossly intact, no nystagmus, tremors, fasiculations,  facial droop, no ataxia, no dysmetria

## 2022-06-01 ENCOUNTER — APPOINTMENT (OUTPATIENT)
Dept: CARDIOLOGY | Facility: CLINIC | Age: 62
End: 2022-06-01

## 2022-06-07 ENCOUNTER — APPOINTMENT (OUTPATIENT)
Dept: NEUROPSYCHOLOGY | Facility: CLINIC | Age: 62
End: 2022-06-07

## 2022-06-08 ENCOUNTER — OUTPATIENT (OUTPATIENT)
Dept: OUTPATIENT SERVICES | Facility: HOSPITAL | Age: 62
LOS: 1 days | Discharge: HOME | End: 2022-06-08

## 2022-06-08 DIAGNOSIS — F50.9 EATING DISORDER, UNSPECIFIED: ICD-10-CM

## 2022-06-08 DIAGNOSIS — Z96.659 PRESENCE OF UNSPECIFIED ARTIFICIAL KNEE JOINT: Chronic | ICD-10-CM

## 2022-07-01 ENCOUNTER — APPOINTMENT (OUTPATIENT)
Dept: CARDIOLOGY | Facility: CLINIC | Age: 62
End: 2022-07-01

## 2022-07-01 VITALS
BODY MASS INDEX: 48.9 KG/M2 | WEIGHT: 276 LBS | SYSTOLIC BLOOD PRESSURE: 150 MMHG | TEMPERATURE: 96 F | DIASTOLIC BLOOD PRESSURE: 91 MMHG | HEIGHT: 63 IN | HEART RATE: 77 BPM

## 2022-07-01 PROCEDURE — 99215 OFFICE O/P EST HI 40 MIN: CPT

## 2022-07-01 RX ORDER — GABAPENTIN 400 MG/1
400 CAPSULE ORAL
Qty: 90 | Refills: 0 | Status: ACTIVE | COMMUNITY
Start: 2022-01-31

## 2022-07-01 RX ORDER — DICLOFENAC SODIUM 50 MG/1
50 TABLET, DELAYED RELEASE ORAL
Qty: 30 | Refills: 0 | Status: ACTIVE | COMMUNITY
Start: 2022-01-31

## 2022-07-01 RX ORDER — CLOTRIMAZOLE AND BETAMETHASONE DIPROPIONATE 10; .5 MG/G; MG/G
1-0.05 CREAM TOPICAL
Qty: 45 | Refills: 0 | Status: ACTIVE | COMMUNITY
Start: 2022-02-28

## 2022-07-01 RX ORDER — METOPROLOL SUCCINATE 50 MG/1
50 TABLET, EXTENDED RELEASE ORAL
Qty: 30 | Refills: 0 | Status: DISCONTINUED | COMMUNITY
Start: 2022-05-10

## 2022-07-01 RX ORDER — LOSARTAN POTASSIUM 25 MG/1
25 TABLET, FILM COATED ORAL DAILY
Qty: 90 | Refills: 3 | Status: ACTIVE | COMMUNITY
Start: 2021-01-04 | End: 1900-01-01

## 2022-07-01 RX ORDER — SEMAGLUTIDE 1.34 MG/ML
2 INJECTION, SOLUTION SUBCUTANEOUS
Qty: 2 | Refills: 0 | Status: COMPLETED | COMMUNITY
Start: 2022-01-31

## 2022-08-12 ENCOUNTER — APPOINTMENT (OUTPATIENT)
Dept: VASCULAR SURGERY | Facility: CLINIC | Age: 62
End: 2022-08-12

## 2022-08-19 ENCOUNTER — NON-APPOINTMENT (OUTPATIENT)
Age: 62
End: 2022-08-19

## 2022-08-25 RX ORDER — ATORVASTATIN CALCIUM 40 MG/1
40 TABLET, FILM COATED ORAL
Qty: 30 | Refills: 2 | Status: ACTIVE | COMMUNITY
Start: 2021-01-06 | End: 1900-01-01

## 2022-10-20 ENCOUNTER — APPOINTMENT (OUTPATIENT)
Dept: CARDIOLOGY | Facility: CLINIC | Age: 62
End: 2022-10-20

## 2023-04-19 ENCOUNTER — APPOINTMENT (OUTPATIENT)
Dept: UROLOGY | Facility: CLINIC | Age: 63
End: 2023-04-19

## 2024-04-01 ENCOUNTER — OUTPATIENT (OUTPATIENT)
Dept: OUTPATIENT SERVICES | Facility: HOSPITAL | Age: 64
LOS: 1 days | End: 2024-04-01
Payer: COMMERCIAL

## 2024-04-01 DIAGNOSIS — Z96.659 PRESENCE OF UNSPECIFIED ARTIFICIAL KNEE JOINT: Chronic | ICD-10-CM

## 2024-04-01 DIAGNOSIS — K02.9 DENTAL CARIES, UNSPECIFIED: ICD-10-CM

## 2024-04-01 PROCEDURE — D0220: CPT

## 2024-04-01 PROCEDURE — D0140: CPT

## 2024-04-03 ENCOUNTER — OUTPATIENT (OUTPATIENT)
Dept: OUTPATIENT SERVICES | Facility: HOSPITAL | Age: 64
LOS: 1 days | End: 2024-04-03

## 2024-04-03 DIAGNOSIS — Z96.659 PRESENCE OF UNSPECIFIED ARTIFICIAL KNEE JOINT: Chronic | ICD-10-CM

## 2024-04-03 DIAGNOSIS — K01.1 IMPACTED TEETH: ICD-10-CM

## 2024-04-03 DIAGNOSIS — K02.9 DENTAL CARIES, UNSPECIFIED: ICD-10-CM

## 2024-04-03 PROCEDURE — D7140: CPT

## 2024-06-21 NOTE — ED ADULT TRIAGE NOTE - BP NONINVASIVE SYSTOLIC (MM HG)
E11.51, E11.3293, I13.0  HCC coding opportunities          Chart Reviewed number of suggestions sent to Provider: 3     Patients Insurance     Medicare Insurance: [employer]vantage          
[id number]